# Patient Record
Sex: MALE | Race: WHITE | NOT HISPANIC OR LATINO | Employment: OTHER | URBAN - METROPOLITAN AREA
[De-identification: names, ages, dates, MRNs, and addresses within clinical notes are randomized per-mention and may not be internally consistent; named-entity substitution may affect disease eponyms.]

---

## 2023-10-15 ENCOUNTER — APPOINTMENT (OUTPATIENT)
Dept: RADIOLOGY | Facility: HOSPITAL | Age: 88
DRG: 177 | End: 2023-10-15
Payer: COMMERCIAL

## 2023-10-15 ENCOUNTER — HOSPITAL ENCOUNTER (INPATIENT)
Facility: HOSPITAL | Age: 88
LOS: 3 days | Discharge: HOME | DRG: 177 | End: 2023-10-18
Attending: EMERGENCY MEDICINE
Payer: COMMERCIAL

## 2023-10-15 DIAGNOSIS — U07.1 COVID: Primary | ICD-10-CM

## 2023-10-15 DIAGNOSIS — R04.2 HEMOPTYSIS: ICD-10-CM

## 2023-10-15 DIAGNOSIS — J18.9 PNEUMONIA OF RIGHT UPPER LOBE DUE TO INFECTIOUS ORGANISM: ICD-10-CM

## 2023-10-15 LAB
ALBUMIN SERPL BCP-MCNC: 4 G/DL (ref 3.4–5)
ALP SERPL-CCNC: 85 U/L (ref 33–136)
ALT SERPL W P-5'-P-CCNC: 23 U/L (ref 10–52)
ANION GAP SERPL CALC-SCNC: 15 MMOL/L (ref 10–20)
AST SERPL W P-5'-P-CCNC: 45 U/L (ref 9–39)
BASOPHILS # BLD AUTO: 0.01 X10*3/UL (ref 0–0.1)
BASOPHILS NFR BLD AUTO: 0.2 %
BILIRUB SERPL-MCNC: 1.2 MG/DL (ref 0–1.2)
BUN SERPL-MCNC: 25 MG/DL (ref 6–23)
CALCIUM SERPL-MCNC: 8.6 MG/DL (ref 8.6–10.3)
CARDIAC TROPONIN I PNL SERPL HS: 32 NG/L (ref 0–20)
CARDIAC TROPONIN I PNL SERPL HS: 35 NG/L (ref 0–20)
CHLORIDE SERPL-SCNC: 101 MMOL/L (ref 98–107)
CO2 SERPL-SCNC: 22 MMOL/L (ref 21–32)
CREAT SERPL-MCNC: 1.27 MG/DL (ref 0.5–1.3)
EOSINOPHIL # BLD AUTO: 0 X10*3/UL (ref 0–0.4)
EOSINOPHIL NFR BLD AUTO: 0 %
ERYTHROCYTE [DISTWIDTH] IN BLOOD BY AUTOMATED COUNT: 14.5 % (ref 11.5–14.5)
FLUAV RNA RESP QL NAA+PROBE: NOT DETECTED
FLUBV RNA RESP QL NAA+PROBE: NOT DETECTED
GFR SERPL CREATININE-BSD FRML MDRD: 54 ML/MIN/1.73M*2
GLUCOSE SERPL-MCNC: 89 MG/DL (ref 74–99)
HCT VFR BLD AUTO: 42.5 % (ref 41–52)
HGB BLD-MCNC: 14.1 G/DL (ref 13.5–17.5)
IMM GRANULOCYTES # BLD AUTO: 0.01 X10*3/UL (ref 0–0.5)
IMM GRANULOCYTES NFR BLD AUTO: 0.2 % (ref 0–0.9)
INR PPP: 2.2 (ref 0.9–1.1)
LACTATE SERPL-SCNC: 1.6 MMOL/L (ref 0.4–2)
LYMPHOCYTES # BLD AUTO: 1.33 X10*3/UL (ref 0.8–3)
LYMPHOCYTES NFR BLD AUTO: 25.7 %
MCH RBC QN AUTO: 32.2 PG (ref 26–34)
MCHC RBC AUTO-ENTMCNC: 33.2 G/DL (ref 32–36)
MCV RBC AUTO: 97 FL (ref 80–100)
MONOCYTES # BLD AUTO: 0.94 X10*3/UL (ref 0.05–0.8)
MONOCYTES NFR BLD AUTO: 18.1 %
NEUTROPHILS # BLD AUTO: 2.89 X10*3/UL (ref 1.6–5.5)
NEUTROPHILS NFR BLD AUTO: 55.8 %
NRBC BLD-RTO: 0 /100 WBCS (ref 0–0)
PLATELET # BLD AUTO: 145 X10*3/UL (ref 150–450)
PMV BLD AUTO: 9.5 FL (ref 7.5–11.5)
POTASSIUM SERPL-SCNC: 4.1 MMOL/L (ref 3.5–5.3)
PROT SERPL-MCNC: 7.2 G/DL (ref 6.4–8.2)
PROTHROMBIN TIME: 25.2 SECONDS (ref 9.8–12.8)
RBC # BLD AUTO: 4.38 X10*6/UL (ref 4.5–5.9)
SARS-COV-2 RNA RESP QL NAA+PROBE: DETECTED
SODIUM SERPL-SCNC: 134 MMOL/L (ref 136–145)
WBC # BLD AUTO: 5.2 X10*3/UL (ref 4.4–11.3)

## 2023-10-15 PROCEDURE — 84484 ASSAY OF TROPONIN QUANT: CPT | Performed by: EMERGENCY MEDICINE

## 2023-10-15 PROCEDURE — 87636 SARSCOV2 & INF A&B AMP PRB: CPT | Performed by: EMERGENCY MEDICINE

## 2023-10-15 PROCEDURE — 99223 1ST HOSP IP/OBS HIGH 75: CPT

## 2023-10-15 PROCEDURE — 96375 TX/PRO/DX INJ NEW DRUG ADDON: CPT

## 2023-10-15 PROCEDURE — 71045 X-RAY EXAM CHEST 1 VIEW: CPT

## 2023-10-15 PROCEDURE — 99285 EMERGENCY DEPT VISIT HI MDM: CPT | Performed by: EMERGENCY MEDICINE

## 2023-10-15 PROCEDURE — 85025 COMPLETE CBC W/AUTO DIFF WBC: CPT | Performed by: EMERGENCY MEDICINE

## 2023-10-15 PROCEDURE — 36415 COLL VENOUS BLD VENIPUNCTURE: CPT | Performed by: EMERGENCY MEDICINE

## 2023-10-15 PROCEDURE — 87186 SC STD MICRODIL/AGAR DIL: CPT | Mod: CMCLAB,PARLAB

## 2023-10-15 PROCEDURE — 2500000004 HC RX 250 GENERAL PHARMACY W/ HCPCS (ALT 636 FOR OP/ED)

## 2023-10-15 PROCEDURE — 87205 SMEAR GRAM STAIN: CPT | Mod: CMCLAB,PARLAB

## 2023-10-15 PROCEDURE — 71045 X-RAY EXAM CHEST 1 VIEW: CPT | Performed by: RADIOLOGY

## 2023-10-15 PROCEDURE — 96365 THER/PROPH/DIAG IV INF INIT: CPT

## 2023-10-15 PROCEDURE — 3E0DX3Z INTRODUCTION OF ANTI-INFLAMMATORY INTO MOUTH AND PHARYNX, EXTERNAL APPROACH: ICD-10-PCS | Performed by: INTERNAL MEDICINE

## 2023-10-15 PROCEDURE — 3E0333Z INTRODUCTION OF ANTI-INFLAMMATORY INTO PERIPHERAL VEIN, PERCUTANEOUS APPROACH: ICD-10-PCS

## 2023-10-15 PROCEDURE — 83605 ASSAY OF LACTIC ACID: CPT | Performed by: EMERGENCY MEDICINE

## 2023-10-15 PROCEDURE — 2500000004 HC RX 250 GENERAL PHARMACY W/ HCPCS (ALT 636 FOR OP/ED): Performed by: EMERGENCY MEDICINE

## 2023-10-15 PROCEDURE — 1200000002 HC GENERAL ROOM WITH TELEMETRY DAILY

## 2023-10-15 PROCEDURE — 80053 COMPREHEN METABOLIC PANEL: CPT | Performed by: EMERGENCY MEDICINE

## 2023-10-15 PROCEDURE — 87075 CULTR BACTERIA EXCEPT BLOOD: CPT | Mod: CMCLAB,PARLAB | Performed by: EMERGENCY MEDICINE

## 2023-10-15 PROCEDURE — 85610 PROTHROMBIN TIME: CPT | Performed by: EMERGENCY MEDICINE

## 2023-10-15 PROCEDURE — 94761 N-INVAS EAR/PLS OXIMETRY MLT: CPT

## 2023-10-15 PROCEDURE — 84484 ASSAY OF TROPONIN QUANT: CPT

## 2023-10-15 RX ORDER — TAMSULOSIN HYDROCHLORIDE 0.4 MG/1
0.4 CAPSULE ORAL NIGHTLY
COMMUNITY

## 2023-10-15 RX ORDER — WARFARIN 3 MG/1
3 TABLET ORAL
COMMUNITY

## 2023-10-15 RX ORDER — GUAIFENESIN 600 MG/1
600 TABLET, EXTENDED RELEASE ORAL 2 TIMES DAILY PRN
Status: DISCONTINUED | OUTPATIENT
Start: 2023-10-15 | End: 2023-10-18 | Stop reason: HOSPADM

## 2023-10-15 RX ORDER — DEXAMETHASONE SODIUM PHOSPHATE 10 MG/ML
6 INJECTION INTRAMUSCULAR; INTRAVENOUS EVERY 24 HOURS
Status: DISCONTINUED | OUTPATIENT
Start: 2023-10-15 | End: 2023-10-18 | Stop reason: HOSPADM

## 2023-10-15 RX ORDER — CEFTRIAXONE 1 G/50ML
1 INJECTION, SOLUTION INTRAVENOUS ONCE
Status: COMPLETED | OUTPATIENT
Start: 2023-10-15 | End: 2023-10-15

## 2023-10-15 RX ORDER — TAMSULOSIN HYDROCHLORIDE 0.4 MG/1
0.4 CAPSULE ORAL NIGHTLY
Status: DISCONTINUED | OUTPATIENT
Start: 2023-10-15 | End: 2023-10-18 | Stop reason: HOSPADM

## 2023-10-15 RX ORDER — ALBUTEROL SULFATE 0.83 MG/ML
3 SOLUTION RESPIRATORY (INHALATION)
Status: DISCONTINUED | OUTPATIENT
Start: 2023-10-15 | End: 2023-10-16

## 2023-10-15 RX ORDER — IPRATROPIUM BROMIDE AND ALBUTEROL SULFATE 2.5; .5 MG/3ML; MG/3ML
3 SOLUTION RESPIRATORY (INHALATION)
Status: DISCONTINUED | OUTPATIENT
Start: 2023-10-15 | End: 2023-10-16

## 2023-10-15 RX ORDER — DEXAMETHASONE SODIUM PHOSPHATE 10 MG/ML
6 INJECTION INTRAMUSCULAR; INTRAVENOUS ONCE
Status: COMPLETED | OUTPATIENT
Start: 2023-10-15 | End: 2023-10-15

## 2023-10-15 RX ORDER — L. ACIDOPHILUS/L.BULGARICUS 1MM CELL
1 TABLET ORAL DAILY
Status: DISCONTINUED | OUTPATIENT
Start: 2023-10-16 | End: 2023-10-18 | Stop reason: HOSPADM

## 2023-10-15 RX ORDER — CEFTRIAXONE 2 G/50ML
2 INJECTION, SOLUTION INTRAVENOUS EVERY 24 HOURS
Status: DISCONTINUED | OUTPATIENT
Start: 2023-10-16 | End: 2023-10-18 | Stop reason: HOSPADM

## 2023-10-15 RX ADMIN — CEFTRIAXONE SODIUM 1 G: 1 INJECTION, SOLUTION INTRAVENOUS at 18:51

## 2023-10-15 RX ADMIN — Medication: at 19:02

## 2023-10-15 RX ADMIN — AZITHROMYCIN 500 MG: 500 INJECTION, POWDER, LYOPHILIZED, FOR SOLUTION INTRAVENOUS at 19:33

## 2023-10-15 RX ADMIN — TAMSULOSIN HYDROCHLORIDE 0.4 MG: 0.4 CAPSULE ORAL at 20:20

## 2023-10-15 RX ADMIN — DEXAMETHASONE SODIUM PHOSPHATE 6 MG: 10 INJECTION, SOLUTION INTRAMUSCULAR; INTRAVENOUS at 18:46

## 2023-10-15 SDOH — SOCIAL STABILITY: SOCIAL INSECURITY: WERE YOU ABLE TO COMPLETE ALL THE BEHAVIORAL HEALTH SCREENINGS?: YES

## 2023-10-15 SDOH — SOCIAL STABILITY: SOCIAL INSECURITY: DOES ANYONE TRY TO KEEP YOU FROM HAVING/CONTACTING OTHER FRIENDS OR DOING THINGS OUTSIDE YOUR HOME?: NO

## 2023-10-15 SDOH — SOCIAL STABILITY: SOCIAL INSECURITY: HAS ANYONE EVER THREATENED TO HURT YOUR FAMILY OR YOUR PETS?: NO

## 2023-10-15 SDOH — SOCIAL STABILITY: SOCIAL INSECURITY: HAVE YOU HAD THOUGHTS OF HARMING ANYONE ELSE?: NO

## 2023-10-15 SDOH — SOCIAL STABILITY: SOCIAL INSECURITY: DO YOU FEEL ANYONE HAS EXPLOITED OR TAKEN ADVANTAGE OF YOU FINANCIALLY OR OF YOUR PERSONAL PROPERTY?: NO

## 2023-10-15 SDOH — SOCIAL STABILITY: SOCIAL INSECURITY: ARE THERE ANY APPARENT SIGNS OF INJURIES/BEHAVIORS THAT COULD BE RELATED TO ABUSE/NEGLECT?: NO

## 2023-10-15 SDOH — SOCIAL STABILITY: SOCIAL INSECURITY: ABUSE: ADULT

## 2023-10-15 SDOH — SOCIAL STABILITY: SOCIAL INSECURITY: DO YOU FEEL UNSAFE GOING BACK TO THE PLACE WHERE YOU ARE LIVING?: NO

## 2023-10-15 SDOH — SOCIAL STABILITY: SOCIAL INSECURITY: ARE YOU OR HAVE YOU BEEN THREATENED OR ABUSED PHYSICALLY, EMOTIONALLY, OR SEXUALLY BY ANYONE?: NO

## 2023-10-15 ASSESSMENT — PAIN SCALES - GENERAL
PAINLEVEL_OUTOF10: 0 - NO PAIN
PAINLEVEL_OUTOF10: 0 - NO PAIN

## 2023-10-15 ASSESSMENT — LIFESTYLE VARIABLES
REASON UNABLE TO ASSESS: YES
HOW OFTEN DO YOU HAVE A DRINK CONTAINING ALCOHOL: NEVER
HOW OFTEN DO YOU HAVE 6 OR MORE DRINKS ON ONE OCCASION: NEVER
EVER FELT BAD OR GUILTY ABOUT YOUR DRINKING: NO
AUDIT-C TOTAL SCORE: 0
SKIP TO QUESTIONS 9-10: 1
HAVE PEOPLE ANNOYED YOU BY CRITICIZING YOUR DRINKING: NO
EVER HAD A DRINK FIRST THING IN THE MORNING TO STEADY YOUR NERVES TO GET RID OF A HANGOVER: NO
PRESCIPTION_ABUSE_PAST_12_MONTHS: NO
SUBSTANCE_ABUSE_PAST_12_MONTHS: NO
AUDIT-C TOTAL SCORE: 0
HOW MANY STANDARD DRINKS CONTAINING ALCOHOL DO YOU HAVE ON A TYPICAL DAY: PATIENT DOES NOT DRINK
HAVE YOU EVER FELT YOU SHOULD CUT DOWN ON YOUR DRINKING: NO

## 2023-10-15 ASSESSMENT — COGNITIVE AND FUNCTIONAL STATUS - GENERAL
STANDING UP FROM CHAIR USING ARMS: A LITTLE
MOVING TO AND FROM BED TO CHAIR: A LITTLE
EATING MEALS: A LITTLE
DRESSING REGULAR UPPER BODY CLOTHING: A LITTLE
DRESSING REGULAR UPPER BODY CLOTHING: A LITTLE
DRESSING REGULAR LOWER BODY CLOTHING: A LITTLE
PERSONAL GROOMING: A LITTLE
DRESSING REGULAR LOWER BODY CLOTHING: A LITTLE
MOBILITY SCORE: 18
TOILETING: A LITTLE
MOVING FROM LYING ON BACK TO SITTING ON SIDE OF FLAT BED WITH BEDRAILS: A LITTLE
WALKING IN HOSPITAL ROOM: A LITTLE
MOVING FROM LYING ON BACK TO SITTING ON SIDE OF FLAT BED WITH BEDRAILS: A LITTLE
MOBILITY SCORE: 18
STANDING UP FROM CHAIR USING ARMS: A LITTLE
HELP NEEDED FOR BATHING: A LITTLE
HELP NEEDED FOR BATHING: A LITTLE
WALKING IN HOSPITAL ROOM: A LITTLE
PATIENT BASELINE BEDBOUND: NO
EATING MEALS: A LITTLE
TURNING FROM BACK TO SIDE WHILE IN FLAT BAD: A LITTLE
TOILETING: A LITTLE
DAILY ACTIVITIY SCORE: 18
MOVING TO AND FROM BED TO CHAIR: A LITTLE
CLIMB 3 TO 5 STEPS WITH RAILING: A LITTLE
CLIMB 3 TO 5 STEPS WITH RAILING: A LITTLE
TURNING FROM BACK TO SIDE WHILE IN FLAT BAD: A LITTLE
DAILY ACTIVITIY SCORE: 18
PERSONAL GROOMING: A LITTLE

## 2023-10-15 ASSESSMENT — ACTIVITIES OF DAILY LIVING (ADL)
TOILETING: NEEDS ASSISTANCE
ADEQUATE_TO_COMPLETE_ADL: YES
JUDGMENT_ADEQUATE_SAFELY_COMPLETE_DAILY_ACTIVITIES: NO
BATHING: NEEDS ASSISTANCE
WALKS IN HOME: NEEDS ASSISTANCE
DRESSING YOURSELF: NEEDS ASSISTANCE
HEARING - LEFT EAR: DIFFICULTY WITH NOISE
GROOMING: NEEDS ASSISTANCE
PATIENT'S MEMORY ADEQUATE TO SAFELY COMPLETE DAILY ACTIVITIES?: NO
HEARING - RIGHT EAR: DIFFICULTY WITH NOISE
FEEDING YOURSELF: NEEDS ASSISTANCE
LACK_OF_TRANSPORTATION: NO

## 2023-10-15 ASSESSMENT — COLUMBIA-SUICIDE SEVERITY RATING SCALE - C-SSRS
2. HAVE YOU ACTUALLY HAD ANY THOUGHTS OF KILLING YOURSELF?: NO
1. IN THE PAST MONTH, HAVE YOU WISHED YOU WERE DEAD OR WISHED YOU COULD GO TO SLEEP AND NOT WAKE UP?: NO
6. HAVE YOU EVER DONE ANYTHING, STARTED TO DO ANYTHING, OR PREPARED TO DO ANYTHING TO END YOUR LIFE?: NO

## 2023-10-15 ASSESSMENT — PATIENT HEALTH QUESTIONNAIRE - PHQ9
SUM OF ALL RESPONSES TO PHQ9 QUESTIONS 1 & 2: 0
1. LITTLE INTEREST OR PLEASURE IN DOING THINGS: NOT AT ALL
2. FEELING DOWN, DEPRESSED OR HOPELESS: NOT AT ALL

## 2023-10-15 ASSESSMENT — PAIN - FUNCTIONAL ASSESSMENT: PAIN_FUNCTIONAL_ASSESSMENT: 0-10

## 2023-10-15 NOTE — ED PROVIDER NOTES
HPI   Chief Complaint   Patient presents with    Flu Symptoms       Patient is a 89-year-old male, medical history significant for atrial fibrillation, pacemaker placement, presents to the emergency department cough shortness of breath.  Patient states symptoms going on for the past 5 days.  States he had some scant hemoptysis.  He states he has had some generalized malaise and arthralgias.  Patient is on Coumadin.  He was recently diagnosed with pneumonia just over a month ago.  He states this feels similar.  His daughter was recently diagnosed with COVID so he did have exposure.                          No data recorded                Patient History   Past Medical History:   Diagnosis Date    Permanent atrial fibrillation (CMS/HCC) 03/01/2022    Permanent atrial fibrillation with RVR    Personal history of other diseases of the circulatory system     History of cardiac disorder     Past Surgical History:   Procedure Laterality Date    OTHER SURGICAL HISTORY  12/07/2022    Pacemaker insertion     No family history on file.  Social History     Tobacco Use    Smoking status: Not on file    Smokeless tobacco: Not on file   Substance Use Topics    Alcohol use: Not on file    Drug use: Not on file       Physical Exam   ED Triage Vitals   Temp Heart Rate Resp BP   10/15/23 1526 10/15/23 1529 10/15/23 1529 10/15/23 1526   37.1 °C (98.8 °F) 79 17 107/60      SpO2 Temp src Heart Rate Source Patient Position   10/15/23 1529 -- -- --   92 %         BP Location FiO2 (%)     -- --             Physical Exam  Vitals and nursing note reviewed.   Constitutional:       General: He is not in acute distress.     Appearance: He is well-developed.   HENT:      Head: Normocephalic and atraumatic.   Eyes:      Conjunctiva/sclera: Conjunctivae normal.   Cardiovascular:      Rate and Rhythm: Normal rate and regular rhythm.      Heart sounds: No murmur heard.  Pulmonary:      Effort: Pulmonary effort is normal. No respiratory distress.       Breath sounds: Wheezing present.   Abdominal:      Palpations: Abdomen is soft.      Tenderness: There is no abdominal tenderness.   Musculoskeletal:         General: No swelling.      Cervical back: Neck supple.   Skin:     General: Skin is warm and dry.      Capillary Refill: Capillary refill takes less than 2 seconds.   Neurological:      Mental Status: He is alert.   Psychiatric:         Mood and Affect: Mood normal.         ED Course & MDM   ED Course as of 10/15/23 1824   Sun Oct 15, 2023   1737 INR therapeutic at 2.2.  Chest x-ray reviewed.  Persistent right upper lobe infiltrate.  Unchanged from prior. [MK]   1752 Metabolic panel demonstrates creatinine of 1.27.  Normal lactic acid.  Mild elevation of the BUN. [MK]      ED Course User Index  [MK] Luis Daniel Andrade MD         Diagnoses as of 10/15/23 1824   COVID   Hemoptysis   Pneumonia of right upper lobe due to infectious organism       Medical Decision Making  Medical Decision Making: Patient presents with cough shortness of breath.  He is borderline hypoxic on arrival.  Sepsis order set was utilized.  Chest x-ray does demonstrate right upper lobe infiltrate.  In review from 6 weeks ago, this is very similar.  He had been treated and now has had persistent cough.  I am not sure if this represents fulminant failure of outpatient treatment or if he has underlying malignancy.  Patient is also found to be COVID-positive.  With ambulation, he does have hypoxia.  Patient is started on Decadron.  I did cover him with antibiotics.  He will be admitted.      Differential Diagnoses Considered: COVID, pneumonia, bronchitis, pulmonary embolus    Chronic Medical Conditions Significantly Affecting Care: History of A-fib on anticoagulation    External Records Reviewed: I reviewed recent and relevant outside records including: [Most recent emergency department visit    Independent Interpretation of Studies:  I independently interpreted: Chest x-ray obtained    Escalation  of Care:  Appropriate for hospitalization given borderline hypoxia with persistent pneumonia  Social Determinants of Health Significantly Affecting Care:  Does not follow-up with primary care    Prescription Drug Consideration: IV Decadron, IV antibiotics    Diagnostic testing considered: Noncontributory    Discussion of Management with Other Providers:   I discussed the patient/results with: Admitting provider agrees plan of care          Procedure  Procedures     Luis Daniel Andrade MD  10/15/23 1849

## 2023-10-15 NOTE — H&P
History Of Present Illness  Fredrick Hutton is a 89 y.o. male with a medical history of permanent atrial fibrillation (on Coumadin), sick sinus syndrome, pacemaker placement, arthritis, who presented to [facility] ED today from home with cough and shortness of breath.  Patient states symptoms have been going on for the past 2-3 weeks.  States he had some scant hemoptysis, (granddaughter states sputum looks very bloody).  He states he has had some generalized malaise and arthralgias that started about 5 days ago.  He was recently diagnosed with pneumonia just over a month ago. States his cough never really went away and does not feel much improvement from when he came here in September and diagnosed with pneumonia. His daughter was recently diagnosed with COVID last wednesday so he did have exposure. Denies known fever, chills, dizziness, chest pain, abdominal pain, urinary symptoms, diarrhea, or constipation. PCP: Dr. Sergo Theodore. Cardiologist: Dr. Núñez. Podiatrist Dr. Teofilo Solis  ER Course: VS on arrival: 37.1C, HR 79, RR 17, 107/60, 91% on RA. EKG unavailable for my review. Labs show RBC 4.38. Platelets 145. sodium 134. BUN 25, GFR 54. AST 54. Troponin 35. PT 25.2, INR 2.2. Covid-10+. Flu negative. Blood cultures collected. See imaging results below. Azithromycin, ceftriaxone, dexamethasone given in ED. Pt will be admitted under the care of Dr. Vaughn who will continue to follow. I was asked to H&P and place initial admission orders.     Past Medical History  As above      Surgical History  As above       Social History  Former smoker. Denies alcohol or drug use. Lives with daughter. Ambulates independently.    Family History  No family history on file.     Allergies  Patient has no known allergies.    Review of Systems   10 point review of systems negative except as noted above.    Physical Exam  Constitutional:       Appearance: Normal appearance.   HENT:      Head: Normocephalic.      Nose: Nose normal.  "     Mouth/Throat:      Mouth: Mucous membranes are moist.      Pharynx: Oropharynx is clear.   Eyes:      Extraocular Movements: Extraocular movements intact.      Conjunctiva/sclera: Conjunctivae normal.      Pupils: Pupils are equal, round, and reactive to light.   Cardiovascular:      Rate and Rhythm: Normal rate. Rhythm irregular.      Pulses: Normal pulses.      Heart sounds: Normal heart sounds.   Pulmonary:      Effort: Pulmonary effort is normal.      Breath sounds: Normal breath sounds.   Abdominal:      General: Abdomen is flat. Bowel sounds are normal.      Palpations: Abdomen is soft.   Musculoskeletal:         General: Normal range of motion.      Cervical back: Normal range of motion and neck supple.   Skin:     General: Skin is warm and dry.      Capillary Refill: Capillary refill takes less than 2 seconds.   Neurological:      General: No focal deficit present.      Mental Status: He is alert and oriented to person, place, and time.   Psychiatric:         Mood and Affect: Mood normal.         Behavior: Behavior normal.         Thought Content: Thought content normal.         Judgment: Judgment normal.          Last Recorded Vitals  Blood pressure 101/62, pulse 78, temperature 37.1 °C (98.8 °F), resp. rate 20, height 1.753 m (5' 9\"), weight 66.7 kg (147 lb), SpO2 95 %.    Relevant Results  Results for orders placed or performed during the hospital encounter of 10/15/23 (from the past 24 hour(s))   Comprehensive Metabolic Panel   Result Value Ref Range    Glucose 89 74 - 99 mg/dL    Sodium 134 (L) 136 - 145 mmol/L    Potassium 4.1 3.5 - 5.3 mmol/L    Chloride 101 98 - 107 mmol/L    Bicarbonate 22 21 - 32 mmol/L    Anion Gap 15 10 - 20 mmol/L    Urea Nitrogen 25 (H) 6 - 23 mg/dL    Creatinine 1.27 0.50 - 1.30 mg/dL    eGFR 54 (L) >60 mL/min/1.73m*2    Calcium 8.6 8.6 - 10.3 mg/dL    Albumin 4.0 3.4 - 5.0 g/dL    Alkaline Phosphatase 85 33 - 136 U/L    Total Protein 7.2 6.4 - 8.2 g/dL    AST 45 (H) 9 - " 39 U/L    Bilirubin, Total 1.2 0.0 - 1.2 mg/dL    ALT 23 10 - 52 U/L   Lactate   Result Value Ref Range    Lactate 1.6 0.4 - 2.0 mmol/L   Troponin I, High Sensitivity   Result Value Ref Range    Troponin I, High Sensitivity 35 (H) 0 - 20 ng/L   Protime-INR   Result Value Ref Range    Protime 25.2 (H) 9.8 - 12.8 seconds    INR 2.2 (H) 0.9 - 1.1   SARS-CoV-2 RT PCR   Result Value Ref Range    Coronavirus 2019, PCR Detected (A) Not Detected   Influenza A, and B PCR   Result Value Ref Range    Flu A Result Not Detected Not Detected    Flu B Result Not Detected Not Detected   CBC and Auto Differential   Result Value Ref Range    WBC 5.2 4.4 - 11.3 x10*3/uL    nRBC 0.0 0.0 - 0.0 /100 WBCs    RBC 4.38 (L) 4.50 - 5.90 x10*6/uL    Hemoglobin 14.1 13.5 - 17.5 g/dL    Hematocrit 42.5 41.0 - 52.0 %    MCV 97 80 - 100 fL    MCH 32.2 26.0 - 34.0 pg    MCHC 33.2 32.0 - 36.0 g/dL    RDW 14.5 11.5 - 14.5 %    Platelets 145 (L) 150 - 450 x10*3/uL    MPV 9.5 7.5 - 11.5 fL    Neutrophils % 55.8 40.0 - 80.0 %    Immature Granulocytes %, Automated 0.2 0.0 - 0.9 %    Lymphocytes % 25.7 13.0 - 44.0 %    Monocytes % 18.1 2.0 - 10.0 %    Eosinophils % 0.0 0.0 - 6.0 %    Basophils % 0.2 0.0 - 2.0 %    Neutrophils Absolute 2.89 1.60 - 5.50 x10*3/uL    Immature Granulocytes Absolute, Automated 0.01 0.00 - 0.50 x10*3/uL    Lymphocytes Absolute 1.33 0.80 - 3.00 x10*3/uL    Monocytes Absolute 0.94 (H) 0.05 - 0.80 x10*3/uL    Eosinophils Absolute 0.00 0.00 - 0.40 x10*3/uL    Basophils Absolute 0.01 0.00 - 0.10 x10*3/uL     XR chest 1 view    Result Date: 10/15/2023  Interpreted By:  Khanh Rodriguez, STUDY: XR CHEST 1 VIEW;  10/15/2023 4:46 pm   INDICATION: Signs/Symptoms:sob.   COMPARISON: Portable chest and CT chest with contrast both from 2 September 2023   ACCESSION NUMBER(S): [id number]   ORDERING CLINICIAN: SYED DAVIS   TECHNIQUE: Single frontal view of the chest; Portable technique   FINDINGS:   The cardiomediastinal silhouette is  unchanged   Background emphysema unchanged   Unchanged right upper lobe consolidation and/or scarring   No new acute process such as a new area of airspace disease, large pleural effusion or demonstrable pneumothorax       Abnormal but unchanged from 2 September 2023   MACRO: None   Signed by: Khanh Rodriguez 10/15/2023 4:58 PM Dictation workstation:   [id number]        Assessment/Plan   Principal Problem:    COVID    89 year old male with a medical history of permanent atrial fibrillation (on Coumadin), sick sinus syndrome, pacemaker placement, arthritis, [facility] with cough and shortness of breath.  Patient states symptoms have been going on for the past 5 days.  States he had some scant hemoptysis, (granddaughter states sputum looks very bloody).  He states he has had some generalized malaise and arthralgias.  He was recently diagnosed with pneumonia just over a month ago. Pulmonology consulted. Continue antibiotics, nebulizers, steroids. Patient will be hospitalized for further medical management.    #Covid 19+ (supplemental oxygen requirement d/t mild hypoxia)  #Suspect pneumonia (RUL), suspect gram negative organism (recently had pneumonia about 1 month ago)  #Hemoptysis  #Elevated troponin, likely 2/2 demand ischemia  #Mild thrombocytopenia  #Generalized weakness  Admit to inpatient/telemetry to Dr. Vaughn  Pulmonology consult and appreciate recs  See imaging results below  Isolation precautions  Continue azithromycin and ceftriaxone  Dexamethosone 6mg IVP daily  Nebulizers  Mucinex  Hold Coumadin due to hemoptysis  Titrate oxygen to maintain sats >92%  Incentive spirometry  Troponin 35. Will trend.  Bronchial hygiene  Sputum culture ordered  Strep pneumonia and legionella urine ordered  UA ordered  Repeat labs in AM    Chronic issues  #Atrial fibrillation  #Sick sinus syndrome  #Arthritis  Continue home meds as appropriate when nursing completes home med rec.  Regular  diet  Full code    #DVT prophylaxis  Hold Coumadin due to hemoptysis  SCD's      I spent 45 minutes in the professional and overall care of this patient.    FULLY EVALUATED AND PLAN   Beatriz Gay, APRN-CNP

## 2023-10-15 NOTE — ED TRIAGE NOTES
Pt in ER with c/o flu-like symptoms. Per pt granddaughter the pt has a productive cough with blood, generalized weakness and a fever. Per pt granddaughter the pt daughter tested positive for Covid last Wednesday.

## 2023-10-15 NOTE — PROGRESS NOTES
Pharmacy Medication History Review    Fredrick Hutton is a 89 y.o. male admitted for No Principal Problem: There is no principal problem currently on the Problem List. Please update the Problem List and refresh.. Pharmacy reviewed the patient's [id number] medications and allergies for accuracy.    The list below reflectives the updated PTA list. Please review each medication in order reconciliation for additional clarification and justification.  (Not in a hospital admission)       The list below reflectives the updated allergy list. Please review each documented allergy for additional clarification and justification.  Allergies  Reviewed by Isabella Griffin CPhT on 10/15/2023   No Known Allergies         Below are additional concerns with the patient's PTA list.      Isabella Griffin CPhT

## 2023-10-16 ENCOUNTER — APPOINTMENT (OUTPATIENT)
Dept: RADIOLOGY | Facility: HOSPITAL | Age: 88
DRG: 177 | End: 2023-10-16
Payer: COMMERCIAL

## 2023-10-16 LAB
ANION GAP SERPL CALC-SCNC: 10 MMOL/L (ref 10–20)
APPEARANCE UR: ABNORMAL
BILIRUB UR STRIP.AUTO-MCNC: NEGATIVE MG/DL
BUN SERPL-MCNC: 25 MG/DL (ref 6–23)
CALCIUM SERPL-MCNC: 8.2 MG/DL (ref 8.6–10.3)
CARDIAC TROPONIN I PNL SERPL HS: 25 NG/L (ref 0–20)
CHLORIDE SERPL-SCNC: 107 MMOL/L (ref 98–107)
CO2 SERPL-SCNC: 23 MMOL/L (ref 21–32)
COLOR UR: YELLOW
CREAT SERPL-MCNC: 1.07 MG/DL (ref 0.5–1.3)
ERYTHROCYTE [DISTWIDTH] IN BLOOD BY AUTOMATED COUNT: 14.5 % (ref 11.5–14.5)
GFR SERPL CREATININE-BSD FRML MDRD: 66 ML/MIN/1.73M*2
GLUCOSE SERPL-MCNC: 124 MG/DL (ref 74–99)
GLUCOSE UR STRIP.AUTO-MCNC: NEGATIVE MG/DL
HCT VFR BLD AUTO: 40.8 % (ref 41–52)
HGB BLD-MCNC: 13.9 G/DL (ref 13.5–17.5)
HYALINE CASTS #/AREA URNS AUTO: ABNORMAL /LPF
INR PPP: 2.2 (ref 0.9–1.1)
KETONES UR STRIP.AUTO-MCNC: NEGATIVE MG/DL
LEUKOCYTE ESTERASE UR QL STRIP.AUTO: NEGATIVE
MCH RBC QN AUTO: 32.5 PG (ref 26–34)
MCHC RBC AUTO-ENTMCNC: 34.1 G/DL (ref 32–36)
MCV RBC AUTO: 95 FL (ref 80–100)
MUCOUS THREADS #/AREA URNS AUTO: ABNORMAL /LPF
NITRITE UR QL STRIP.AUTO: NEGATIVE
NRBC BLD-RTO: 0 /100 WBCS (ref 0–0)
PH UR STRIP.AUTO: 5 [PH]
PLATELET # BLD AUTO: 143 X10*3/UL (ref 150–450)
PMV BLD AUTO: 9.4 FL (ref 7.5–11.5)
POTASSIUM SERPL-SCNC: 4.2 MMOL/L (ref 3.5–5.3)
PROT UR STRIP.AUTO-MCNC: NEGATIVE MG/DL
PROTHROMBIN TIME: 25.5 SECONDS (ref 9.8–12.8)
RBC # BLD AUTO: 4.28 X10*6/UL (ref 4.5–5.9)
RBC # UR STRIP.AUTO: ABNORMAL /UL
RBC #/AREA URNS AUTO: ABNORMAL /HPF
SODIUM SERPL-SCNC: 136 MMOL/L (ref 136–145)
SP GR UR STRIP.AUTO: 1.02
UROBILINOGEN UR STRIP.AUTO-MCNC: <2 MG/DL
WBC # BLD AUTO: 3.9 X10*3/UL (ref 4.4–11.3)
WBC #/AREA URNS AUTO: ABNORMAL /HPF

## 2023-10-16 PROCEDURE — 82374 ASSAY BLOOD CARBON DIOXIDE: CPT

## 2023-10-16 PROCEDURE — 85027 COMPLETE CBC AUTOMATED: CPT

## 2023-10-16 PROCEDURE — 94667 MNPJ CHEST WALL 1ST: CPT

## 2023-10-16 PROCEDURE — 84484 ASSAY OF TROPONIN QUANT: CPT | Performed by: NURSE PRACTITIONER

## 2023-10-16 PROCEDURE — 36415 COLL VENOUS BLD VENIPUNCTURE: CPT

## 2023-10-16 PROCEDURE — 71250 CT THORAX DX C-: CPT | Mod: MG

## 2023-10-16 PROCEDURE — 87899 AGENT NOS ASSAY W/OPTIC: CPT | Mod: CMCLAB,PARLAB

## 2023-10-16 PROCEDURE — 2500000002 HC RX 250 W HCPCS SELF ADMINISTERED DRUGS (ALT 637 FOR MEDICARE OP, ALT 636 FOR OP/ED): Performed by: INTERNAL MEDICINE

## 2023-10-16 PROCEDURE — 2500000004 HC RX 250 GENERAL PHARMACY W/ HCPCS (ALT 636 FOR OP/ED)

## 2023-10-16 PROCEDURE — 94640 AIRWAY INHALATION TREATMENT: CPT

## 2023-10-16 PROCEDURE — 97165 OT EVAL LOW COMPLEX 30 MIN: CPT | Mod: GO

## 2023-10-16 PROCEDURE — 85610 PROTHROMBIN TIME: CPT | Performed by: NURSE PRACTITIONER

## 2023-10-16 PROCEDURE — 36415 COLL VENOUS BLD VENIPUNCTURE: CPT | Performed by: NURSE PRACTITIONER

## 2023-10-16 PROCEDURE — 87449 NOS EACH ORGANISM AG IA: CPT | Mod: CMCLAB,PARLAB

## 2023-10-16 PROCEDURE — 97162 PT EVAL MOD COMPLEX 30 MIN: CPT | Mod: GP

## 2023-10-16 PROCEDURE — 2500000002 HC RX 250 W HCPCS SELF ADMINISTERED DRUGS (ALT 637 FOR MEDICARE OP, ALT 636 FOR OP/ED)

## 2023-10-16 PROCEDURE — 71250 CT THORAX DX C-: CPT | Performed by: STUDENT IN AN ORGANIZED HEALTH CARE EDUCATION/TRAINING PROGRAM

## 2023-10-16 PROCEDURE — 1200000002 HC GENERAL ROOM WITH TELEMETRY DAILY

## 2023-10-16 PROCEDURE — 2500000001 HC RX 250 WO HCPCS SELF ADMINISTERED DRUGS (ALT 637 FOR MEDICARE OP): Performed by: INTERNAL MEDICINE

## 2023-10-16 PROCEDURE — 81001 URINALYSIS AUTO W/SCOPE: CPT

## 2023-10-16 RX ORDER — DEXAMETHASONE 6 MG/1
6 TABLET ORAL DAILY
Qty: 10 TABLET | Refills: 0 | Status: SHIPPED | OUTPATIENT
Start: 2023-10-16 | End: 2023-10-26

## 2023-10-16 RX ORDER — ALBUTEROL SULFATE 0.83 MG/ML
3 SOLUTION RESPIRATORY (INHALATION) EVERY 4 HOURS PRN
Status: DISCONTINUED | OUTPATIENT
Start: 2023-10-16 | End: 2023-10-18 | Stop reason: HOSPADM

## 2023-10-16 RX ORDER — GUAIFENESIN 600 MG/1
600 TABLET, EXTENDED RELEASE ORAL 2 TIMES DAILY
Qty: 28 TABLET | Refills: 0 | Status: SHIPPED | OUTPATIENT
Start: 2023-10-16 | End: 2023-10-30

## 2023-10-16 RX ORDER — IPRATROPIUM BROMIDE AND ALBUTEROL SULFATE 2.5; .5 MG/3ML; MG/3ML
3 SOLUTION RESPIRATORY (INHALATION) 3 TIMES DAILY PRN
Status: DISCONTINUED | OUTPATIENT
Start: 2023-10-16 | End: 2023-10-18 | Stop reason: HOSPADM

## 2023-10-16 RX ORDER — DOXYCYCLINE 100 MG/1
100 CAPSULE ORAL 2 TIMES DAILY
Qty: 20 CAPSULE | Refills: 0 | Status: SHIPPED | OUTPATIENT
Start: 2023-10-16 | End: 2023-10-26

## 2023-10-16 RX ADMIN — Medication 1 TABLET: at 10:51

## 2023-10-16 RX ADMIN — DEXAMETHASONE SODIUM PHOSPHATE 6 MG: 10 INJECTION, SOLUTION INTRAMUSCULAR; INTRAVENOUS at 21:15

## 2023-10-16 RX ADMIN — GUAIFENESIN 600 MG: 600 TABLET, EXTENDED RELEASE ORAL at 10:52

## 2023-10-16 RX ADMIN — IPRATROPIUM BROMIDE AND ALBUTEROL SULFATE 3 ML: .5; 3 SOLUTION RESPIRATORY (INHALATION) at 06:52

## 2023-10-16 RX ADMIN — AZITHROMYCIN MONOHYDRATE 500 MG: 500 INJECTION, POWDER, LYOPHILIZED, FOR SOLUTION INTRAVENOUS at 20:45

## 2023-10-16 RX ADMIN — CEFTRIAXONE SODIUM 2 G: 2 INJECTION, SOLUTION INTRAVENOUS at 20:44

## 2023-10-16 RX ADMIN — IPRATROPIUM BROMIDE AND ALBUTEROL SULFATE 3 ML: .5; 3 SOLUTION RESPIRATORY (INHALATION) at 19:54

## 2023-10-16 RX ADMIN — ALBUTEROL SULFATE 3 ML: 2.5 SOLUTION RESPIRATORY (INHALATION) at 06:53

## 2023-10-16 RX ADMIN — TAMSULOSIN HYDROCHLORIDE 0.4 MG: 0.4 CAPSULE ORAL at 20:45

## 2023-10-16 ASSESSMENT — COGNITIVE AND FUNCTIONAL STATUS - GENERAL
TURNING FROM BACK TO SIDE WHILE IN FLAT BAD: A LITTLE
HELP NEEDED FOR BATHING: A LOT
MOVING TO AND FROM BED TO CHAIR: A LITTLE
TOILETING: A LITTLE
DRESSING REGULAR UPPER BODY CLOTHING: A LITTLE
WALKING IN HOSPITAL ROOM: A LITTLE
MOBILITY SCORE: 18
TOILETING: A LITTLE
WALKING IN HOSPITAL ROOM: A LITTLE
MOVING FROM LYING ON BACK TO SITTING ON SIDE OF FLAT BED WITH BEDRAILS: A LITTLE
MOBILITY SCORE: 18
CLIMB 3 TO 5 STEPS WITH RAILING: A LITTLE
MOVING TO AND FROM BED TO CHAIR: A LITTLE
TURNING FROM BACK TO SIDE WHILE IN FLAT BAD: A LITTLE
MOVING FROM LYING ON BACK TO SITTING ON SIDE OF FLAT BED WITH BEDRAILS: A LITTLE
STANDING UP FROM CHAIR USING ARMS: A LITTLE
DRESSING REGULAR LOWER BODY CLOTHING: A LOT
HELP NEEDED FOR BATHING: A LOT
CLIMB 3 TO 5 STEPS WITH RAILING: A LITTLE
DAILY ACTIVITIY SCORE: 18
DAILY ACTIVITIY SCORE: 18
DRESSING REGULAR UPPER BODY CLOTHING: A LITTLE
STANDING UP FROM CHAIR USING ARMS: A LITTLE
DRESSING REGULAR LOWER BODY CLOTHING: A LOT

## 2023-10-16 ASSESSMENT — PAIN SCALES - GENERAL: PAINLEVEL_OUTOF10: 0 - NO PAIN

## 2023-10-16 ASSESSMENT — PAIN - FUNCTIONAL ASSESSMENT: PAIN_FUNCTIONAL_ASSESSMENT: 0-10

## 2023-10-16 NOTE — CONSULTS
Consults    Reason For Consult     Cough and dyspnea.    History Of Present Illness  Fredrick Hutton is a 89 y.o. male presenting with complaints of cough and dyspnea duration 2 to 3 weeks.  Patient was diagnosed with right upper lobe pneumonia in September and treated with antibiotics.  Despite that she has experienced cough and dyspnea for the last few weeks.  Patient denies chest pain.  Upon arrival he tested positive for COVID-19.  Patient was admitted for further management.  Denies fever or chills.  No nausea vomiting or diarrhea.  He had some mild hemoptysis at home.  Patient uses Coumadin for anticoagulation.  Recently he was exposed to his daughter who was positive for COVID.     Past Medical History  Past Medical History:   Diagnosis Date    Permanent atrial fibrillation (CMS/[facility]) 03/01/2022    Permanent atrial fibrillation with RVR    Personal history of other diseases of the circulatory system     History of cardiac disorder       Surgical History  Past Surgical History:   Procedure Laterality Date    OTHER SURGICAL HISTORY  12/07/2022    Pacemaker insertion        Social History  Social History     Tobacco Use    Smoking status: Former     Types: Cigarettes    Smokeless tobacco: Never   Substance Use Topics    Alcohol use: Not Currently    Drug use: Never       Family History  No family history on file.            Allergies  Patient has no known allergies.    Review of Systems     10 system review of systems performed and negative for any complaints outside of the ones mentioned in history of present illness.    Physical Exam     Head and face no deformities.  Oropharynx normal mucosa.  Neck is supple no thyromegaly.  Chest is symmetric no crackles.  Heart is regular no murmurs  Abdomen is soft and nontender  Lower extremities no edema  Skin is intact  Neurologically no gross motor or sensory deficit.    Vital Signs  Blood pressure 100/62, pulse 79, temperature 35.8 °C (96.4 °F), temperature source  "Temporal, resp. rate 20, height 1.753 m (5' 9\"), weight 66.7 kg (147 lb), SpO2 90 %.  Oxygen Therapy  SpO2: 90 %  Oxygen Therapy: None (Room air)  O2 Delivery Method: Nasal cannula       Relevant Results  XR chest 1 view 10/15/2023    Narrative  Interpreted By:  Khanh Rodriguez,  STUDY:  XR CHEST 1 VIEW;  10/15/2023 4:46 pm    INDICATION:  Signs/Symptoms:sob.    COMPARISON:  Portable chest and CT chest with contrast both from 2 September 2023    ACCESSION NUMBER(S):  [id number]    ORDERING CLINICIAN:  SYED DAVIS    TECHNIQUE:  Single frontal view of the chest; Portable technique    FINDINGS:    The cardiomediastinal silhouette is unchanged    Background emphysema unchanged    Unchanged right upper lobe consolidation and/or scarring    No new acute process such as a new area of airspace disease, large  pleural effusion or demonstrable pneumothorax    Impression  Abnormal but unchanged from 2 September 2023          Assessment/Plan        COVID-19 viral illness with cough and dyspnea.  Right upper lobe infiltrate which is unchanged compared to 6 weeks ago.  Differential diagnosis considering slowly resolving pneumonia versus scar tissue, versus malignancy which would be less likely.  History of atrial fibrillation patient is on Coumadin for anticoagulation he reports mild hemoptysis which has improved.    Plan:  Repeat another CAT scan of the chest in 6 to 8 weeks.  If right upper lobe infiltrate persists will discussed with the patient need for CT-guided needle biopsy versus conservative follow-up with CAT scans.  DVT prophylaxis.  Bronchodilators.  Administer oxygen if needed to maintain saturation above 90%.           Klever Hernandez MD    "

## 2023-10-16 NOTE — CARE PLAN
Problem: Fall/Injury  Goal: Not fall by end of shift  Outcome: Progressing  Goal: Be free from injury by end of the shift  Outcome: Progressing     Problem: Respiratory  Goal: Clear secretions with interventions this shift  Outcome: Progressing  Flowsheets (Taken 10/15/2023 2253)  Clear secretions with interventions this shift:   Encourage/provide pulmonary hygiene/secretion clearance   Med administration/monitoring of effect  Goal: Verbalize decreased shortness of breath this shift  Outcome: Progressing  Flowsheets (Taken 10/15/2023 2253)  Verbalize decreased shortness of breath this shift: Encourage/provide pulmonary hygiene/secretion clearance     Problem: Psychosocial Needs  Goal: Demonstrates ability to cope with hospitalization/illness  Outcome: Progressing  Flowsheets (Taken 10/15/2023 2253)  Demonstrates ability to cope with hospitalization/illness:   Encourage verbalization of feelings/concerns/expectations   Provide low-stimulation environment as needed   The patient's goals for the shift include remain free from falls    The clinical goals for the shift include improve resp function

## 2023-10-16 NOTE — DISCHARGE SUMMARY
Discharge Diagnosis  COVID    Issues Requiring Follow-Up  Patient fully evaluated today and stable.  No oxygen requirements.    Discharge Meds     Your medication list        START taking these medications        Instructions Last Dose Given Next Dose Due   dexAMETHasone 6 mg tablet  Commonly known as: Decadron      Take 1 tablet (6 mg) by mouth once daily for 10 days.       doxycycline 100 mg capsule  Commonly known as: Vibramycin      Take 1 capsule (100 mg) by mouth 2 times a day for 10 days. Take with at least 8 ounces (large glass) of water, do not lie down for 30 minutes after       guaiFENesin 600 mg 12 hr tablet  Commonly known as: Mucinex      Take 1 tablet (600 mg) by mouth 2 times a day for 14 days. Do not crush, chew, or split.              CONTINUE taking these medications        Instructions Last Dose Given Next Dose Due   tamsulosin 0.4 mg 24 hr capsule  Commonly known as: Flomax           warfarin 3 mg tablet  Commonly known as: Coumadin                     Where to Get Your Medications        These medications were sent to [id number] - [address] AT [facility] OF [facility]/[address], [facility] [address]      Phone: [phone]   dexAMETHasone 6 mg tablet  doxycycline 100 mg capsule  guaiFENesin 600 mg 12 hr tablet         Test Results Pending At Discharge  Pending Labs       Order Current Status    Respiratory Culture/Smear In process    Blood Culture Preliminary result    Blood Culture Preliminary result            Hospital Course      Tex August MD  Physician  Internal Medicine     H&P      Addendum     Date of Service: 10/15/2023  7:50 PM     Addendum       Expand All Collapse All    History Of Present Illness  Fredrick Hutton is a 89 y.o. male with a medical history of permanent atrial fibrillation (on Coumadin), sick sinus syndrome, pacemaker placement, arthritis, who presented to [facility] ED today from home with cough and shortness of  breath.  Patient states symptoms have been going on for the past 2-3 weeks.  States he had some scant hemoptysis, (granddaughter states sputum looks very bloody).  He states he has had some generalized malaise and arthralgias that started about 5 days ago.  He was recently diagnosed with pneumonia just over a month ago. States his cough never really went away and does not feel much improvement from when he came here in September and diagnosed with pneumonia. His daughter was recently diagnosed with COVID last wednesday so he did have exposure. Denies known fever, chills, dizziness, chest pain, abdominal pain, urinary symptoms, diarrhea, or constipation. PCP: Dr. Sergo Theodore. Cardiologist: Dr. Núñez. Podiatrist Dr. Teofilo Solis  ER Course: VS on arrival: 37.1C, HR 79, RR 17, 107/60, 91% on RA. EKG unavailable for my review. Labs show RBC 4.38. Platelets 145. sodium 134. BUN 25, GFR 54. AST 54. Troponin 35. PT 25.2, INR 2.2. Covid-10+. Flu negative. Blood cultures collected. See imaging results below. Azithromycin, ceftriaxone, dexamethasone given in ED. Pt will be admitted under the care of Dr. Vaughn who will continue to follow. I was asked to H&P and place initial admission orders.     Past Medical History  As above        Surgical History  As above        Social History  Former smoker. Denies alcohol or drug use. Lives with daughter. Ambulates independently.     Family History  Family History   No family history on file.        Allergies  Patient has no known allergies.     Review of Systems   10 point review of systems negative except as noted above.     Physical Exam  Constitutional:       Appearance: Normal appearance.   HENT:      Head: Normocephalic.      Nose: Nose normal.      Mouth/Throat:      Mouth: Mucous membranes are moist.      Pharynx: Oropharynx is clear.   Eyes:      Extraocular Movements: Extraocular movements intact.      Conjunctiva/sclera: Conjunctivae normal.      Pupils: Pupils are  "equal, round, and reactive to light.   Cardiovascular:      Rate and Rhythm: Normal rate. Rhythm irregular.      Pulses: Normal pulses.      Heart sounds: Normal heart sounds.   Pulmonary:      Effort: Pulmonary effort is normal.      Breath sounds: Normal breath sounds.   Abdominal:      General: Abdomen is flat. Bowel sounds are normal.      Palpations: Abdomen is soft.   Musculoskeletal:         General: Normal range of motion.      Cervical back: Normal range of motion and neck supple.   Skin:     General: Skin is warm and dry.      Capillary Refill: Capillary refill takes less than 2 seconds.   Neurological:      General: No focal deficit present.      Mental Status: He is alert and oriented to person, place, and time.   Psychiatric:         Mood and Affect: Mood normal.         Behavior: Behavior normal.         Thought Content: Thought content normal.         Judgment: Judgment normal.            Last Recorded Vitals  Blood pressure 101/62, pulse 78, temperature 37.1 °C (98.8 °F), resp. rate 20, height 1.753 m (5' 9\"), weight 66.7 kg (147 lb), SpO2 95 %.     Relevant Results        Results for orders placed or performed during the hospital encounter of 10/15/23 (from the past 24 hour(s))   Comprehensive Metabolic Panel   Result Value Ref Range     Glucose 89 74 - 99 mg/dL     Sodium 134 (L) 136 - 145 mmol/L     Potassium 4.1 3.5 - 5.3 mmol/L     Chloride 101 98 - 107 mmol/L     Bicarbonate 22 21 - 32 mmol/L     Anion Gap 15 10 - 20 mmol/L     Urea Nitrogen 25 (H) 6 - 23 mg/dL     Creatinine 1.27 0.50 - 1.30 mg/dL     eGFR 54 (L) >60 mL/min/1.73m*2     Calcium 8.6 8.6 - 10.3 mg/dL     Albumin 4.0 3.4 - 5.0 g/dL     Alkaline Phosphatase 85 33 - 136 U/L     Total Protein 7.2 6.4 - 8.2 g/dL     AST 45 (H) 9 - 39 U/L     Bilirubin, Total 1.2 0.0 - 1.2 mg/dL     ALT 23 10 - 52 U/L   Lactate   Result Value Ref Range     Lactate 1.6 0.4 - 2.0 mmol/L   Troponin I, High Sensitivity   Result Value Ref Range     Troponin " I, High Sensitivity 35 (H) 0 - 20 ng/L   Protime-INR   Result Value Ref Range     Protime 25.2 (H) 9.8 - 12.8 seconds     INR 2.2 (H) 0.9 - 1.1   SARS-CoV-2 RT PCR   Result Value Ref Range     Coronavirus 2019, PCR Detected (A) Not Detected   Influenza A, and B PCR   Result Value Ref Range     Flu A Result Not Detected Not Detected     Flu B Result Not Detected Not Detected   CBC and Auto Differential   Result Value Ref Range     WBC 5.2 4.4 - 11.3 x10*3/uL     nRBC 0.0 0.0 - 0.0 /100 WBCs     RBC 4.38 (L) 4.50 - 5.90 x10*6/uL     Hemoglobin 14.1 13.5 - 17.5 g/dL     Hematocrit 42.5 41.0 - 52.0 %     MCV 97 80 - 100 fL     MCH 32.2 26.0 - 34.0 pg     MCHC 33.2 32.0 - 36.0 g/dL     RDW 14.5 11.5 - 14.5 %     Platelets 145 (L) 150 - 450 x10*3/uL     MPV 9.5 7.5 - 11.5 fL     Neutrophils % 55.8 40.0 - 80.0 %     Immature Granulocytes %, Automated 0.2 0.0 - 0.9 %     Lymphocytes % 25.7 13.0 - 44.0 %     Monocytes % 18.1 2.0 - 10.0 %     Eosinophils % 0.0 0.0 - 6.0 %     Basophils % 0.2 0.0 - 2.0 %     Neutrophils Absolute 2.89 1.60 - 5.50 x10*3/uL     Immature Granulocytes Absolute, Automated 0.01 0.00 - 0.50 x10*3/uL     Lymphocytes Absolute 1.33 0.80 - 3.00 x10*3/uL     Monocytes Absolute 0.94 (H) 0.05 - 0.80 x10*3/uL     Eosinophils Absolute 0.00 0.00 - 0.40 x10*3/uL     Basophils Absolute 0.01 0.00 - 0.10 x10*3/uL      XR chest 1 view     Result Date: 10/15/2023  Interpreted By:  Khanh Rodriguez, STUDY: XR CHEST 1 VIEW;  10/15/2023 4:46 pm   INDICATION: Signs/Symptoms:sob.   COMPARISON: Portable chest and CT chest with contrast both from 2 September 2023   ACCESSION NUMBER(S): [id number]   ORDERING CLINICIAN: SYED DAVIS   TECHNIQUE: Single frontal view of the chest; Portable technique   FINDINGS:   The cardiomediastinal silhouette is unchanged   Background emphysema unchanged   Unchanged right upper lobe consolidation and/or scarring   No new acute process such as a new area of airspace disease, large pleural  effusion or demonstrable pneumothorax        Abnormal but unchanged from 2 September 2023   MACRO: None   Signed by: Khanh Rodriguez 10/15/2023 4:58 PM Dictation workstation:   [id number]            Assessment/Plan   Principal Problem:    COVID     89 year old male with a medical history of permanent atrial fibrillation (on Coumadin), sick sinus syndrome, pacemaker placement, arthritis, who presented to [facility] ED today from home with cough and shortness of breath.  Patient states symptoms have been going on for the past 5 days.  States he had some scant hemoptysis, (granddaughter states sputum looks very bloody).  He states he has had some generalized malaise and arthralgias.  He was recently diagnosed with pneumonia just over a month ago. Pulmonology consulted. Continue antibiotics, nebulizers, steroids. Patient will be hospitalized for further medical management.     #Covid 19+ (supplemental oxygen requirement d/t mild hypoxia)  #Suspect pneumonia (RUL), suspect gram negative organism (recently had pneumonia about 1 month ago)  #Hemoptysis  #Elevated troponin, likely 2/2 demand ischemia  #Mild thrombocytopenia  #Generalized weakness  Admit to inpatient/telemetry to Dr. Vaughn  Pulmonology consult and appreciate recs  See imaging results below  Isolation precautions  Continue azithromycin and ceftriaxone  Dexamethosone 6mg IVP daily  Nebulizers  Mucinex  Hold Coumadin due to hemoptysis  Titrate oxygen to maintain sats >92%  Incentive spirometry  Troponin 35. Will trend.  Bronchial hygiene  Sputum culture ordered  Strep pneumonia and legionella urine ordered  UA ordered  Repeat labs in AM     Chronic issues  #Atrial fibrillation  #Sick sinus syndrome  #Arthritis  Continue home meds as appropriate when nursing completes home med rec.  Regular diet  Full code     #DVT prophylaxis  Hold Coumadin due to hemoptysis  SCD's        I spent 45 minutes in the professional and overall care of this patient.     FULLY EVALUATED  AND SHRUTHI Jones-EBEN                       Revision History    Patient fully evaluated on October 16 and cleared for discharge.  Heart rate under good control and respiratory status is stable.  Discharged on oral steroids and antibiotics.     Pertinent Physical Exam At Time of Discharge  Physical Exam    Outpatient Follow-Up  Future Appointments   Date Time Provider Department Center   10/18/2023  1:00 PM [facility] [id number] [facility] [place]   10/24/2023  2:15 PM PAR [id number] [facility]ACIST [id number] [place]   11/14/2023  3:20 PM SHRUTHI Berrios-EBEN [id number] [place]         Tex August MD

## 2023-10-16 NOTE — PROGRESS NOTES
Occupational Therapy    Evaluation    Patient Name: Fredrick Hutton  MRN: [id number]  Today's Date: 10/16/2023  Time Calculation  Start Time: 1155  Stop Time: 1216  Time Calculation (min): 21 min        Assessment:  End of Session Communication: Bedside nurse  End of Session Patient Position:  (Supine in bed with all needs in reach and no complaints noted)     Plan:  OT Frequency: 3 times per week  OT Discharge Recommendations: Low intensity level of continued care       Subjective       General:  General  Reason for Referral: impaired adl  Referred By: Coco  Past Medical History Relevant to Rehab: pt. admitted with hemoptysis, cough/sob, dx:  pneumonia (+) covid, pmh:  sss, pacemaker, a fib, arthritis  Prior to Session Communication: Bedside nurse  General Comment: pt resting in bed, agreeable to therapy intervention  Precautions:  Precautions Comment: isolatin due to covid (+), telemetry, ext catheter  Vital Signs:     Pain:  Pain Assessment  Pain Assessment:  (no pain complaints)    Objective   Cognition:              Home Living:  Home Living Comments: pt. lives with his daughter, 1 floor, daughter works 2 days/wk, stall shower with gb, seat (doesnt use), gb by toilet, owns wh. walker  Prior Function:  Prior Function Comments: pt. independent with adl, does not drive, daughter does, daughter and patient share iadl tasks  IADL History:     ADL:  ADL Comments: pt. able to don/dof sock with mod assist, moderate exertion during task, would anticipate standing aspects of adl tasks to require cga  Activity Tolerance:  Endurance:  (deconditioned)  Bed Mobility/Transfers: Bed Mobility  Bed Mobility:  (sba supine <> sit)   and Transfers  Transfer:  (sit<> stand from eob required cga, mobility without device bed<> bathroom door completed with cga, pt. impulsive with cues for pacing)      Strength:  Strength Comments: bue strength/arom wfl    Outcome Measures:[facility] Daily Activity  Putting on and taking off regular lower  body clothing: A lot  Bathing (including washing, rinsing, drying): A lot  Putting on and taking off regular upper body clothing: A little  Toileting, which includes using toilet, bedpan or urinal: A little  Taking care of personal grooming such as brushing teeth: None  Eating Meals: None  Daily Activity - Total Score: 18        Education Documentation  Precautions, taught by Naty Zapata OT at 10/16/2023  1:59 PM.  Learner: Patient  Readiness: Acceptance  Method: Explanation  Response: Verbalizes Understanding, Needs Reinforcement    ADL Training, taught by Naty Zapata OT at 10/16/2023  1:59 PM.  Learner: Patient  Readiness: Acceptance  Method: Explanation  Response: Verbalizes Understanding, Needs Reinforcement    Precautions, taught by Naty Zapata OT at 10/16/2023  1:59 PM.  Learner: Patient  Readiness: Acceptance  Method: Explanation  Response: Verbalizes Understanding, Needs Reinforcement    Mobility Training, taught by Naty Zapata OT at 10/16/2023  1:59 PM.  Learner: Patient  Readiness: Acceptance  Method: Explanation  Response: Verbalizes Understanding, Needs Reinforcement    Education Comments  No comments found.        OP EDUCATION:       Goals:  Encounter Problems       Encounter Problems (Active)       OT Goals       increase bue ther ex/activity x 7-10 minutes and increase standing tolerance x 3-5 minutes to promote greater activity tolerance for assist with adl.   (Progressing)       Start:  10/16/23    Expected End:  10/23/23            Increase functional mobility and  functional transfers to supervision for bed/chair/toilet/shower with dme prn   (Progressing)       Start:  10/16/23    Expected End:  10/23/23            Increase lb dressing to supervision with dme prn  (Progressing)       Start:  10/16/23    Expected End:  10/23/23            Increase lb bathing to supervision with dme prn  (Progressing)       Start:  10/16/23    Expected End:  10/23/23            Increase toileting to  supervision with dme prn  (Progressing)       Start:  10/16/23    Expected End:  10/23/23

## 2023-10-16 NOTE — CARE PLAN
Problem: PT Problem  Goal: STG - Pt will transition supine <> sitting with SUP   Outcome: Progressing  Goal: STG - Pt will transfer STS with SUP   Outcome: Progressing  Goal: STG - Pt will amb 50' using no AD with SBA  Outcome: Progressing

## 2023-10-16 NOTE — PROGRESS NOTES
10/16/23 1426   Discharge Planning   Living Arrangements Children   Support Systems Children   Type of Residence Private residence   Number of Stairs to Enter Residence 3   Number of Stairs Within Residence 0   Do you have animals or pets at home? No   Who is requesting discharge planning? Provider   Patient expects to be discharged to: [facility]     Met with pt , pt would like to go  home at discharge, asking for therapy order, per pt he tells me that he is independent at home.   Pt asked me to call his daughter, as I did and per daughter she told me that he has a walker but will not use and she is not sure if home is best discharge plan at this time.  PCP is Dr Sergo Theodore.  Will follow.  Jazmine Dejesus RN [facility]  18,  spoke again with daughter, updated on pt's status and plan is for [facility] at discharge, this will be internal MD order, will alert team.  Jazmine Dejesus RN [facility]

## 2023-10-16 NOTE — CARE PLAN
Problem: OT Goals  Goal: increase bue ther ex/activity x 7-10 minutes and increase standing tolerance x 3-5 minutes to promote greater activity tolerance for assist with adl.    Outcome: Progressing  Goal: Increase functional mobility and  functional transfers to supervision for bed/chair/toilet/shower with dme prn    Outcome: Progressing  Goal: Increase lb dressing to supervision with dme prn   Outcome: Progressing  Goal: Increase lb bathing to supervision with dme prn   Outcome: Progressing  Goal: Increase toileting to supervision with dme prn   Outcome: Progressing

## 2023-10-16 NOTE — PROGRESS NOTES
Physical Therapy    Physical Therapy Evaluation    Patient Name: Fredrick Hutton  MRN: [id number]  Today's Date: 10/16/2023   Time Calculation  Start Time: 1154  Stop Time: 1216  Time Calculation (min): 22 min    Assessment/Plan   PT Assessment  PT Assessment Results: Decreased mobility  Rehab Prognosis: Good  End of Session Communication: Bedside nurse  End of Session Patient Position:  (Supine in bed with all needs in reach and no complaints noted)  IP OR SWING BED PT PLAN  Inpatient or Swing Bed: Inpatient  PT Plan  Treatment/Interventions: Bed mobility, Transfer training, Gait training  PT Plan: Skilled PT  PT Frequency: 3 times per week  PT Discharge Recommendations: Low intensity level of continued care    Subjective     Current Problem:  1. COVID        2. Hemoptysis        3. Pneumonia of right upper lobe due to infectious organism          Past Medical History:   Diagnosis Date    Permanent atrial fibrillation (CMS/HCC) 03/01/2022    Permanent atrial fibrillation with RVR    Personal history of other diseases of the circulatory system     History of cardiac disorder     Past Surgical History:   Procedure Laterality Date    OTHER SURGICAL HISTORY  12/07/2022    Pacemaker insertion       General Visit Information:  General  Reason for Referral: PT Eval and Treat  Referred By: Tex August  Prior to Session Communication: Bedside nurse  Patient Position Received:  (Supine in bed and agreeable to PT)    Home Living:  Home Living  Type of Home:  (Pt lives with his daughter in a 1 story house with 0STE. Pt has a stall shower with a seat and a standard toilet. Pt's daughter is working 2 days per week.)    Prior Level of Function:  Prior Function Per Pt/Caregiver Report  Level of [place]: Independent with ADLs and functional transfers (Pt amb without an AD however owns a RW, his daughter assists with IADLs and driving.)    Precautions:  Precautions  Precautions Comment: Covid+ precautions and fall  precautions    Objective     Pain:  Pain Assessment  Pain Assessment:  (0/10)    Cognition:  Cognition  Overall Cognitive Status: Within Functional Limits    General Assessments:  Sensation  Light Touch: No apparent deficits  Strength  Strength Comments: B LE ROM and strength WFL  Dynamic Standing Balance  Dynamic Standing-Comments: Fair standing balance without an AD    Functional Assessments:     Bed Mobility  Bed Mobility:  (supine < >sitting SBA)  Transfers  Transfer:  (STS from EOB: CGA)  Ambulation/Gait Training  Ambulation/Gait Training Performed:  (Pt was able to amb 20' x 2 without an AD with CGA demonstrating improved balance as he walked and with some cueing required for safety awareness.)    Outcome Measures:  [facility] Basic Mobility  Turning from your back to your side while in a flat bed without using bedrails: A little  Moving from lying on your back to sitting on the side of a flat bed without using bedrails: A little  Moving to and from bed to chair (including a wheelchair): A little  Standing up from a chair using your arms (e.g. wheelchair or bedside chair): A little  To walk in hospital room: A little  Climbing 3-5 steps with railing: A little  Basic Mobility - Total Score: 18     Goals:  Encounter Problems       Encounter Problems (Active)       PT Problem       STG - Pt will transition supine <> sitting with SUP  (Progressing)       Start:  10/16/23    Expected End:  10/30/23            STG - Pt will transfer STS with SUP  (Progressing)       Start:  10/16/23    Expected End:  10/30/23            STG - Pt will amb 50' using no AD with SBA (Progressing)       Start:  10/16/23    Expected End:  10/30/23                 Education Documentation  Precautions, taught by Freda Manzano PT at 10/16/2023  1:56 PM.  Learner: Patient  Readiness: Acceptance  Method: Explanation  Response: Verbalizes Understanding, Needs Reinforcement    Mobility Training, taught by Freda Manzano PT at 10/16/2023  1:56  PM.  Learner: Patient  Readiness: Acceptance  Method: Explanation  Response: Verbalizes Understanding, Needs Reinforcement    Education Comments  No comments found.

## 2023-10-17 ENCOUNTER — HOME HEALTH ADMISSION (OUTPATIENT)
Dept: HOME HEALTH SERVICES | Facility: HOME HEALTH | Age: 88
End: 2023-10-17
Payer: COMMERCIAL

## 2023-10-17 LAB
ALBUMIN SERPL BCP-MCNC: 3.7 G/DL (ref 3.4–5)
ALP SERPL-CCNC: 74 U/L (ref 33–136)
ALT SERPL W P-5'-P-CCNC: 32 U/L (ref 10–52)
ANION GAP SERPL CALC-SCNC: 13 MMOL/L (ref 10–20)
AST SERPL W P-5'-P-CCNC: 66 U/L (ref 9–39)
BILIRUB SERPL-MCNC: 0.7 MG/DL (ref 0–1.2)
BUN SERPL-MCNC: 33 MG/DL (ref 6–23)
CALCIUM SERPL-MCNC: 8.8 MG/DL (ref 8.6–10.3)
CHLORIDE SERPL-SCNC: 106 MMOL/L (ref 98–107)
CO2 SERPL-SCNC: 21 MMOL/L (ref 21–32)
CREAT SERPL-MCNC: 0.99 MG/DL (ref 0.5–1.3)
ERYTHROCYTE [DISTWIDTH] IN BLOOD BY AUTOMATED COUNT: 14.1 % (ref 11.5–14.5)
GFR SERPL CREATININE-BSD FRML MDRD: 73 ML/MIN/1.73M*2
GLUCOSE SERPL-MCNC: 132 MG/DL (ref 74–99)
HCT VFR BLD AUTO: 41 % (ref 41–52)
HGB BLD-MCNC: 14.1 G/DL (ref 13.5–17.5)
MCH RBC QN AUTO: 32 PG (ref 26–34)
MCHC RBC AUTO-ENTMCNC: 34.4 G/DL (ref 32–36)
MCV RBC AUTO: 93 FL (ref 80–100)
NRBC BLD-RTO: 0 /100 WBCS (ref 0–0)
PLATELET # BLD AUTO: 147 X10*3/UL (ref 150–450)
PMV BLD AUTO: 9.7 FL (ref 7.5–11.5)
POTASSIUM SERPL-SCNC: 4.3 MMOL/L (ref 3.5–5.3)
PROT SERPL-MCNC: 6.3 G/DL (ref 6.4–8.2)
RBC # BLD AUTO: 4.4 X10*6/UL (ref 4.5–5.9)
SODIUM SERPL-SCNC: 136 MMOL/L (ref 136–145)
WBC # BLD AUTO: 6.1 X10*3/UL (ref 4.4–11.3)

## 2023-10-17 PROCEDURE — 97110 THERAPEUTIC EXERCISES: CPT | Mod: GP

## 2023-10-17 PROCEDURE — 97530 THERAPEUTIC ACTIVITIES: CPT | Mod: GP

## 2023-10-17 PROCEDURE — 36415 COLL VENOUS BLD VENIPUNCTURE: CPT | Performed by: NURSE PRACTITIONER

## 2023-10-17 PROCEDURE — 2500000004 HC RX 250 GENERAL PHARMACY W/ HCPCS (ALT 636 FOR OP/ED): Performed by: INTERNAL MEDICINE

## 2023-10-17 PROCEDURE — 2500000005 HC RX 250 GENERAL PHARMACY W/O HCPCS: Performed by: INTERNAL MEDICINE

## 2023-10-17 PROCEDURE — 2500000002 HC RX 250 W HCPCS SELF ADMINISTERED DRUGS (ALT 637 FOR MEDICARE OP, ALT 636 FOR OP/ED): Performed by: INTERNAL MEDICINE

## 2023-10-17 PROCEDURE — 2500000004 HC RX 250 GENERAL PHARMACY W/ HCPCS (ALT 636 FOR OP/ED)

## 2023-10-17 PROCEDURE — 84075 ASSAY ALKALINE PHOSPHATASE: CPT | Performed by: NURSE PRACTITIONER

## 2023-10-17 PROCEDURE — 94640 AIRWAY INHALATION TREATMENT: CPT

## 2023-10-17 PROCEDURE — 1200000002 HC GENERAL ROOM WITH TELEMETRY DAILY

## 2023-10-17 PROCEDURE — 2500000001 HC RX 250 WO HCPCS SELF ADMINISTERED DRUGS (ALT 637 FOR MEDICARE OP): Performed by: INTERNAL MEDICINE

## 2023-10-17 PROCEDURE — XW033E5 INTRODUCTION OF REMDESIVIR ANTI-INFECTIVE INTO PERIPHERAL VEIN, PERCUTANEOUS APPROACH, NEW TECHNOLOGY GROUP 5: ICD-10-PCS | Performed by: INTERNAL MEDICINE

## 2023-10-17 PROCEDURE — 85027 COMPLETE CBC AUTOMATED: CPT | Performed by: NURSE PRACTITIONER

## 2023-10-17 RX ORDER — CHOLESTYRAMINE 4 G/4.8G
4 POWDER, FOR SUSPENSION ORAL 2 TIMES DAILY
Status: DISCONTINUED | OUTPATIENT
Start: 2023-10-17 | End: 2023-10-18 | Stop reason: HOSPADM

## 2023-10-17 RX ORDER — QUETIAPINE FUMARATE 25 MG/1
12.5 TABLET, FILM COATED ORAL 2 TIMES DAILY
Status: DISCONTINUED | OUTPATIENT
Start: 2023-10-17 | End: 2023-10-18 | Stop reason: HOSPADM

## 2023-10-17 RX ORDER — FUROSEMIDE 10 MG/ML
20 INJECTION INTRAMUSCULAR; INTRAVENOUS ONCE
Status: COMPLETED | OUTPATIENT
Start: 2023-10-17 | End: 2023-10-17

## 2023-10-17 RX ADMIN — AZITHROMYCIN MONOHYDRATE 500 MG: 500 INJECTION, POWDER, LYOPHILIZED, FOR SOLUTION INTRAVENOUS at 22:21

## 2023-10-17 RX ADMIN — QUETIAPINE FUMARATE 12.5 MG: 25 TABLET ORAL at 22:21

## 2023-10-17 RX ADMIN — QUETIAPINE FUMARATE 12.5 MG: 25 TABLET ORAL at 10:16

## 2023-10-17 RX ADMIN — DEXAMETHASONE SODIUM PHOSPHATE 6 MG: 10 INJECTION, SOLUTION INTRAMUSCULAR; INTRAVENOUS at 22:21

## 2023-10-17 RX ADMIN — FUROSEMIDE 20 MG: 10 INJECTION, SOLUTION INTRAMUSCULAR; INTRAVENOUS at 18:25

## 2023-10-17 RX ADMIN — TAMSULOSIN HYDROCHLORIDE 0.4 MG: 0.4 CAPSULE ORAL at 22:36

## 2023-10-17 RX ADMIN — IPRATROPIUM BROMIDE AND ALBUTEROL SULFATE 3 ML: .5; 3 SOLUTION RESPIRATORY (INHALATION) at 19:18

## 2023-10-17 RX ADMIN — REMDESIVIR 200 MG: 100 INJECTION, POWDER, LYOPHILIZED, FOR SOLUTION INTRAVENOUS at 16:30

## 2023-10-17 RX ADMIN — CHOLESTYRAMINE 4 G: 4 POWDER, FOR SUSPENSION ORAL at 22:21

## 2023-10-17 RX ADMIN — Medication 1 TABLET: at 09:17

## 2023-10-17 RX ADMIN — CEFTRIAXONE SODIUM 2 G: 2 INJECTION, SOLUTION INTRAVENOUS at 23:48

## 2023-10-17 RX ADMIN — IPRATROPIUM BROMIDE AND ALBUTEROL SULFATE 3 ML: .5; 3 SOLUTION RESPIRATORY (INHALATION) at 06:55

## 2023-10-17 ASSESSMENT — COGNITIVE AND FUNCTIONAL STATUS - GENERAL
CLIMB 3 TO 5 STEPS WITH RAILING: A LITTLE
MOBILITY SCORE: 23

## 2023-10-17 ASSESSMENT — PAIN SCALES - GENERAL
PAINLEVEL_OUTOF10: 0 - NO PAIN

## 2023-10-17 ASSESSMENT — PAIN - FUNCTIONAL ASSESSMENT
PAIN_FUNCTIONAL_ASSESSMENT: 0-10
PAIN_FUNCTIONAL_ASSESSMENT: 0-10

## 2023-10-17 NOTE — PROGRESS NOTES
Fredrick Hutton is a 89 y.o. male on day 2 of admission presenting with COVID.      Subjective   Patient having increased confusion       Objective     Last Recorded Vitals  /81 (BP Location: Right arm, Patient Position: Sitting)   Pulse 80   Temp 35.9 °C (96.6 °F) (Temporal)   Resp 17   Wt 66.7 kg (147 lb)   SpO2 94%   Intake/Output last 3 Shifts:    Intake/Output Summary (Last 24 hours) at 10/17/2023 1749  Last data filed at 10/17/2023 1630  Gross per 24 hour   Intake 550 ml   Output --   Net 550 ml       Admission Weight  Weight: 66.7 kg (147 lb) (10/15/23 1529)    Daily Weight  10/15/23 : 66.7 kg (147 lb)    Image Results  CT chest wo IV contrast  Narrative: Interpreted By:  Magalie Ramirez,   STUDY:  CT CHEST WO IV CONTRAST;  10/16/2023 10:20 am      INDICATION:  COVID PNA COUGHING UP BLOOD.      COMPARISON:  Chest 09/02/2023      ACCESSION NUMBER(S):  [id number]      ORDERING CLINICIAN:  MARY KAY CABAN      TECHNIQUE:  Helical data acquisition of the chest was obtained without IV  contrast material.  Images were reformatted in axial, coronal, and  sagittal planes.      FINDINGS:  LINE AND DEVICES:  Left cardiac pacer leads terminating in the right atrium and right  ventricle.      LUNGS AND AIRWAYS:  The trachea and central airways are patent without endobronchial  lesions.      Stable background moderate-to-severe centrilobular and paraseptal  emphysema. Similar-appearing severe right upper lobe subpleural  fibrotic changes with intervening airspace opacities. Ladora  subpleural bullae are present in the bilateral lower lobes and  lingula. Stable bilateral pleural effusions, small on right and trace  on the left. No new focal consolidation, pulmonary edema or  pneumothorax. There is no suspicious nodules.      MEDIASTINUM AND MAKENNA, LOWER NECK AND AXILLA:  The visualized thyroid gland is within normal limits.      No pathologically enlarged thoracic lymphadenopathy is identified.       Esophagus appears within normal limits as seen.      HEART AND VESSELS:  The thoracic aorta is of normal course and caliber with mild  atherosclerotic calcifications.      Main pulmonary artery is normal in caliber.      Minimal coronary artery calcifications in the LAD and circumflex  arteries. The study is not optimized for evaluation of coronary  arteries.      Mild left atrial enlargement.      No evidence of pericardial effusion.      UPPER ABDOMEN:  No acute findings.      CHEST WALL AND OSSEOUS STRUCTURES:  No acute osseous lesions. Severe left acromioclavicular  osteoarthritis.      Impression: Stable right upper interstitial fibrosis with superimposed airspace  opacities. Differential considerations may include persistent  pneumonia, chronic atelectasis, and neoplastic process. Consider  follow-up in 3-6 months to resolution.      Stable small right and trace left pleural effusions.      MACRO:  None      Signed by: Magalie Ramirez 10/16/2023 12:04 PM  Dictation workstation:   [id number]      Physical Exam    Relevant Results               Assessment/Plan   This patient currently has cardiac telemetry ordered; if you would like to modify or discontinue the telemetry order, click here to go to the orders activity to modify/discontinue the order.              Principal Problem:    PAXTON August MD  Physician  Internal Medicine     H&P      Addendum     Date of Service: 10/15/2023  7:50 PM     Addendum       Expand All Collapse All    History Of Present Illness  Fredrick Hutton is a 89 y.o. male with a medical history of permanent atrial fibrillation (on Coumadin), sick sinus syndrome, pacemaker placement, arthritis, who presented to [facility] ED today from home with cough and shortness of breath.  Patient states symptoms have been going on for the past 2-3 weeks.  States he had some scant hemoptysis, (granddaughter states sputum looks very bloody).  He states he has had some generalized  malaise and arthralgias that started about 5 days ago.  He was recently diagnosed with pneumonia just over a month ago. States his cough never really went away and does not feel much improvement from when he came here in September and diagnosed with pneumonia. His daughter was recently diagnosed with COVID last wednesday so he did have exposure. Denies known fever, chills, dizziness, chest pain, abdominal pain, urinary symptoms, diarrhea, or constipation. PCP: Dr. Sergo Theodore. Cardiologist: Dr. Núñez. Podiatrist Dr. Teofilo Solis  ER Course: VS on arrival: 37.1C, HR 79, RR 17, 107/60, 91% on RA. EKG unavailable for my review. Labs show RBC 4.38. Platelets 145. sodium 134. BUN 25, GFR 54. AST 54. Troponin 35. PT 25.2, INR 2.2. Covid-10+. Flu negative. Blood cultures collected. See imaging results below. Azithromycin, ceftriaxone, dexamethasone given in ED. Pt will be admitted under the care of Dr. Vaughn who will continue to follow. I was asked to H&P and place initial admission orders.     Past Medical History  As above        Surgical History  As above        Social History  Former smoker. Denies alcohol or drug use. Lives with daughter. Ambulates independently.     Family History  Family History   No family history on file.        Allergies  Patient has no known allergies.     Review of Systems   10 point review of systems negative except as noted above.     Physical Exam  Constitutional:       Appearance: Normal appearance.   HENT:      Head: Normocephalic.      Nose: Nose normal.      Mouth/Throat:      Mouth: Mucous membranes are moist.      Pharynx: Oropharynx is clear.   Eyes:      Extraocular Movements: Extraocular movements intact.      Conjunctiva/sclera: Conjunctivae normal.      Pupils: Pupils are equal, round, and reactive to light.   Cardiovascular:      Rate and Rhythm: Normal rate. Rhythm irregular.      Pulses: Normal pulses.      Heart sounds: Normal heart sounds.   Pulmonary:      Effort:  "Pulmonary effort is normal.      Breath sounds: Normal breath sounds.   Abdominal:      General: Abdomen is flat. Bowel sounds are normal.      Palpations: Abdomen is soft.   Musculoskeletal:         General: Normal range of motion.      Cervical back: Normal range of motion and neck supple.   Skin:     General: Skin is warm and dry.      Capillary Refill: Capillary refill takes less than 2 seconds.   Neurological:      General: No focal deficit present.      Mental Status: He is alert and oriented to person, place, and time.   Psychiatric:         Mood and Affect: Mood normal.         Behavior: Behavior normal.         Thought Content: Thought content normal.         Judgment: Judgment normal.            Last Recorded Vitals  Blood pressure 101/62, pulse 78, temperature 37.1 °C (98.8 °F), resp. rate 20, height 1.753 m (5' 9\"), weight 66.7 kg (147 lb), SpO2 95 %.     Relevant Results        Results for orders placed or performed during the hospital encounter of 10/15/23 (from the past 24 hour(s))   Comprehensive Metabolic Panel   Result Value Ref Range     Glucose 89 74 - 99 mg/dL     Sodium 134 (L) 136 - 145 mmol/L     Potassium 4.1 3.5 - 5.3 mmol/L     Chloride 101 98 - 107 mmol/L     Bicarbonate 22 21 - 32 mmol/L     Anion Gap 15 10 - 20 mmol/L     Urea Nitrogen 25 (H) 6 - 23 mg/dL     Creatinine 1.27 0.50 - 1.30 mg/dL     eGFR 54 (L) >60 mL/min/1.73m*2     Calcium 8.6 8.6 - 10.3 mg/dL     Albumin 4.0 3.4 - 5.0 g/dL     Alkaline Phosphatase 85 33 - 136 U/L     Total Protein 7.2 6.4 - 8.2 g/dL     AST 45 (H) 9 - 39 U/L     Bilirubin, Total 1.2 0.0 - 1.2 mg/dL     ALT 23 10 - 52 U/L   Lactate   Result Value Ref Range     Lactate 1.6 0.4 - 2.0 mmol/L   Troponin I, High Sensitivity   Result Value Ref Range     Troponin I, High Sensitivity 35 (H) 0 - 20 ng/L   Protime-INR   Result Value Ref Range     Protime 25.2 (H) 9.8 - 12.8 seconds     INR 2.2 (H) 0.9 - 1.1   SARS-CoV-2 RT PCR   Result Value Ref Range     " Coronavirus 2019, PCR Detected (A) Not Detected   Influenza A, and B PCR   Result Value Ref Range     Flu A Result Not Detected Not Detected     Flu B Result Not Detected Not Detected   CBC and Auto Differential   Result Value Ref Range     WBC 5.2 4.4 - 11.3 x10*3/uL     nRBC 0.0 0.0 - 0.0 /100 WBCs     RBC 4.38 (L) 4.50 - 5.90 x10*6/uL     Hemoglobin 14.1 13.5 - 17.5 g/dL     Hematocrit 42.5 41.0 - 52.0 %     MCV 97 80 - 100 fL     MCH 32.2 26.0 - 34.0 pg     MCHC 33.2 32.0 - 36.0 g/dL     RDW 14.5 11.5 - 14.5 %     Platelets 145 (L) 150 - 450 x10*3/uL     MPV 9.5 7.5 - 11.5 fL     Neutrophils % 55.8 40.0 - 80.0 %     Immature Granulocytes %, Automated 0.2 0.0 - 0.9 %     Lymphocytes % 25.7 13.0 - 44.0 %     Monocytes % 18.1 2.0 - 10.0 %     Eosinophils % 0.0 0.0 - 6.0 %     Basophils % 0.2 0.0 - 2.0 %     Neutrophils Absolute 2.89 1.60 - 5.50 x10*3/uL     Immature Granulocytes Absolute, Automated 0.01 0.00 - 0.50 x10*3/uL     Lymphocytes Absolute 1.33 0.80 - 3.00 x10*3/uL     Monocytes Absolute 0.94 (H) 0.05 - 0.80 x10*3/uL     Eosinophils Absolute 0.00 0.00 - 0.40 x10*3/uL     Basophils Absolute 0.01 0.00 - 0.10 x10*3/uL      XR chest 1 view     Result Date: 10/15/2023  Interpreted By:  Khanh Rodriguez, STUDY: XR CHEST 1 VIEW;  10/15/2023 4:46 pm   INDICATION: Signs/Symptoms:sob.   COMPARISON: Portable chest and CT chest with contrast both from 2 September 2023   ACCESSION NUMBER(S): [id number]   ORDERING CLINICIAN: SYED DAVIS   TECHNIQUE: Single frontal view of the chest; Portable technique   FINDINGS:   The cardiomediastinal silhouette is unchanged   Background emphysema unchanged   Unchanged right upper lobe consolidation and/or scarring   No new acute process such as a new area of airspace disease, large pleural effusion or demonstrable pneumothorax        Abnormal but unchanged from 2 September 2023   MACRO: None   Signed by: Khanh Rodriguez 10/15/2023 4:58 PM Dictation workstation:   [id number]             Assessment/Plan   Principal Problem:    COVID     89 year old male with a medical history of permanent atrial fibrillation (on Coumadin), sick sinus syndrome, pacemaker placement, arthritis, who presented to [facility] ED today from home with cough and shortness of breath.  Patient states symptoms have been going on for the past 5 days.  States he had some scant hemoptysis, (granddaughter states sputum looks very bloody).  He states he has had some generalized malaise and arthralgias.  He was recently diagnosed with pneumonia just over a month ago. Pulmonology consulted. Continue antibiotics, nebulizers, steroids. Patient will be hospitalized for further medical management.     #Covid 19+ (supplemental oxygen requirement d/t mild hypoxia)  #Suspect pneumonia (RUL), suspect gram negative organism (recently had pneumonia about 1 month ago)  #Hemoptysis  #Elevated troponin, likely 2/2 demand ischemia  #Mild thrombocytopenia  #Generalized weakness  Admit to inpatient/telemetry to Dr. Vaughn  Pulmonology consult and appreciate recs  See imaging results below  Isolation precautions  Continue azithromycin and ceftriaxone  Dexamethosone 6mg IVP daily  Nebulizers  Mucinex  Hold Coumadin due to hemoptysis  Titrate oxygen to maintain sats >92%  Incentive spirometry  Troponin 35. Will trend.  Bronchial hygiene  Sputum culture ordered  Strep pneumonia and legionella urine ordered  UA ordered  Repeat labs in AM     Chronic issues  #Atrial fibrillation  #Sick sinus syndrome  #Arthritis  Continue home meds as appropriate when nursing completes home med rec.  Regular diet  Full code     #DVT prophylaxis  Hold Coumadin due to hemoptysis  SCD's        I spent 45 minutes in the professional and overall care of this patient.     FULLY EVALUATED AND PLAN   Beatriz Gay, SHRUTHI-CNP                       Revision History               Patient fully evaluated on October 17.  Requiring oxygen at increased levels.  Remdesivir added to  present regimen.  Repeat chest x-ray ordered.  Seroquel for delirium.    Tex August MD

## 2023-10-17 NOTE — PROGRESS NOTES
Physical Therapy    Physical Therapy Treatment    Patient Name: Fredrick Hutton  MRN: [id number]  Today's Date: 10/17/2023  Time Calculation  Start Time: 1300  Stop Time: 1330  Time Calculation (min): 30 min     Assessment/Plan   PT Assessment  PT Assessment Results: Decreased mobility  Rehab Prognosis: Good  End of Session Communication: Bedside nurse  End of Session Patient Position:  (Sitting up in his room with all needs in reach and no complaints noted.)  PT Plan  Treatment/Interventions: Bed mobility, Transfer training, Gait training  PT Plan: Skilled PT  PT Frequency: 3 times per week  PT Discharge Recommendations: Low intensity level of continued care      General Visit Information:   PT  Visit  PT Received On: 10/17/23  General  Reason for Referral: PT Eval and Treat  Referred By: Tex August  Prior to Session Communication: Bedside nurse  Patient Position Received:  (Pt received sitting up in his room and agreeable to PT)    Subjective   Precautions:  Precautions  Precautions Comment: isolatin due to covid (+)    Objective   Pain:  Pain Assessment  Pain Assessment:  (0/10)  Cognition:  Cognition  Overall Cognitive Status: Within Functional Limits     Treatments:  Therapeutic Exercise  Therapeutic Exercise Performed:  (Pt instructed in seated B LE ther ex including LAQs, marches, hip add with pillow, and HR)    Bed Mobility  Bed Mobility:  (not assessed due to pt being out of bed)    Ambulation/Gait Training  Ambulation/Gait Training Performed:  (Pt observed amb around his room independently without an AD demonstraing improved balance and stability.)  Transfers  Transfer:  (STS: independent)    Outcome Measures:  [facility] Basic Mobility  Turning from your back to your side while in a flat bed without using bedrails: None  Moving from lying on your back to sitting on the side of a flat bed without using bedrails: None  Moving to and from bed to chair (including a wheelchair): None  Standing up from a chair  using your arms (e.g. wheelchair or bedside chair): None  To walk in hospital room: None  Climbing 3-5 steps with railing: A little  Basic Mobility - Total Score: 23    Education Documentation  Precautions, taught by Freda Manzano PT at 10/17/2023  2:12 PM.  Learner: Patient  Readiness: Acceptance  Method: Explanation  Response: Verbalizes Understanding    Mobility Training, taught by Freda Manzano PT at 10/17/2023  2:12 PM.  Learner: Patient  Readiness: Acceptance  Method: Explanation  Response: Verbalizes Understanding    Education Comments  No comments found.      OP EDUCATION:       Encounter Problems       Encounter Problems (Active)       PT Problem       STG - Pt will transition supine <> sitting with SUP  (Progressing)       Start:  10/16/23    Expected End:  10/30/23            STG - Pt will transfer STS with SUP  (Progressing)       Start:  10/16/23    Expected End:  10/30/23            STG - Pt will amb 50' using no AD with SBA (Progressing)       Start:  10/16/23    Expected End:  10/30/23

## 2023-10-17 NOTE — CARE PLAN
Problem: Fall/Injury  Goal: Not fall by end of shift  Outcome: Progressing  Goal: Be free from injury by end of the shift  Outcome: Progressing  Goal: Verbalize understanding of personal risk factors for fall in the hospital  Outcome: Progressing  Goal: Verbalize understanding of risk factor reduction measures to prevent injury from fall in the home  Outcome: Progressing  Goal: Use assistive devices by end of the shift  Outcome: Progressing  Goal: Pace activities to prevent fatigue by end of the shift  Outcome: Progressing     Problem: Respiratory  Goal: Clear secretions with interventions this shift  Outcome: Progressing  Goal: Minimize anxiety/maximize coping throughout shift  Outcome: Progressing  Goal: Minimal/no exertional discomfort or dyspnea this shift  Outcome: Progressing  Goal: No signs of respiratory distress (eg. Use of accessory muscles. Peds grunting)  Outcome: Progressing  Goal: Patent airway maintained this shift  Outcome: Progressing  Goal: Tolerate mechanical ventilation evidenced by VS/agitation level this shift  Outcome: Progressing  Goal: Tolerate pulmonary toileting this shift  Outcome: Progressing  Goal: Verbalize decreased shortness of breath this shift  Outcome: Progressing  Goal: Wean oxygen to maintain O2 saturation per order/standard this shift  Outcome: Progressing  Goal: Increase self care and/or family involvement in next 24 hours  Outcome: Progressing     Problem: Psychosocial Needs  Goal: Demonstrates ability to cope with hospitalization/illness  Outcome: Progressing  Goal: Collaborate with me, my family, and caregiver to identify my specific goals  Outcome: Progressing     Problem: Discharge Barriers  Goal: My discharge needs are met  Outcome: Progressing   The patient's goals for the shift include remain free from falls    The clinical goals for the shift include improve resp function

## 2023-10-18 ENCOUNTER — APPOINTMENT (OUTPATIENT)
Dept: RADIOLOGY | Facility: HOSPITAL | Age: 88
DRG: 177 | End: 2023-10-18
Payer: COMMERCIAL

## 2023-10-18 ENCOUNTER — TELEPHONE (OUTPATIENT)
Dept: INPATIENT UNIT | Facility: HOSPITAL | Age: 88
End: 2023-10-18

## 2023-10-18 VITALS
BODY MASS INDEX: 21.78 KG/M2 | OXYGEN SATURATION: 93 % | WEIGHT: 147.05 LBS | HEIGHT: 69 IN | RESPIRATION RATE: 16 BRPM | TEMPERATURE: 97.2 F | HEART RATE: 81 BPM | SYSTOLIC BLOOD PRESSURE: 132 MMHG | DIASTOLIC BLOOD PRESSURE: 72 MMHG

## 2023-10-18 LAB
ALBUMIN SERPL BCP-MCNC: 3.7 G/DL (ref 3.4–5)
ALP SERPL-CCNC: 74 U/L (ref 33–136)
ALT SERPL W P-5'-P-CCNC: 45 U/L (ref 10–52)
ANION GAP SERPL CALC-SCNC: 15 MMOL/L (ref 10–20)
AST SERPL W P-5'-P-CCNC: 117 U/L (ref 9–39)
BACTERIA SPEC RESP CULT: ABNORMAL
BILIRUB SERPL-MCNC: 0.8 MG/DL (ref 0–1.2)
BUN SERPL-MCNC: 40 MG/DL (ref 6–23)
CALCIUM SERPL-MCNC: 8.5 MG/DL (ref 8.6–10.3)
CHLORIDE SERPL-SCNC: 102 MMOL/L (ref 98–107)
CO2 SERPL-SCNC: 21 MMOL/L (ref 21–32)
CREAT SERPL-MCNC: 1.21 MG/DL (ref 0.5–1.3)
ERYTHROCYTE [DISTWIDTH] IN BLOOD BY AUTOMATED COUNT: 14.2 % (ref 11.5–14.5)
GFR SERPL CREATININE-BSD FRML MDRD: 57 ML/MIN/1.73M*2
GLUCOSE SERPL-MCNC: 119 MG/DL (ref 74–99)
GRAM STN SPEC: ABNORMAL
GRAM STN SPEC: ABNORMAL
HCT VFR BLD AUTO: 40.8 % (ref 41–52)
HGB BLD-MCNC: 13.9 G/DL (ref 13.5–17.5)
LEGIONELLA AG UR QL: NEGATIVE
MCH RBC QN AUTO: 32.1 PG (ref 26–34)
MCHC RBC AUTO-ENTMCNC: 34.1 G/DL (ref 32–36)
MCV RBC AUTO: 94 FL (ref 80–100)
NRBC BLD-RTO: 0 /100 WBCS (ref 0–0)
PLATELET # BLD AUTO: 165 X10*3/UL (ref 150–450)
PMV BLD AUTO: 9.8 FL (ref 7.5–11.5)
POTASSIUM SERPL-SCNC: 4.1 MMOL/L (ref 3.5–5.3)
PROT SERPL-MCNC: 6.4 G/DL (ref 6.4–8.2)
RBC # BLD AUTO: 4.33 X10*6/UL (ref 4.5–5.9)
S PNEUM AG UR QL: NEGATIVE
SODIUM SERPL-SCNC: 134 MMOL/L (ref 136–145)
WBC # BLD AUTO: 9.9 X10*3/UL (ref 4.4–11.3)

## 2023-10-18 PROCEDURE — 2500000004 HC RX 250 GENERAL PHARMACY W/ HCPCS (ALT 636 FOR OP/ED): Performed by: INTERNAL MEDICINE

## 2023-10-18 PROCEDURE — 71045 X-RAY EXAM CHEST 1 VIEW: CPT | Performed by: RADIOLOGY

## 2023-10-18 PROCEDURE — 94640 AIRWAY INHALATION TREATMENT: CPT

## 2023-10-18 PROCEDURE — 36415 COLL VENOUS BLD VENIPUNCTURE: CPT | Performed by: NURSE PRACTITIONER

## 2023-10-18 PROCEDURE — 2500000001 HC RX 250 WO HCPCS SELF ADMINISTERED DRUGS (ALT 637 FOR MEDICARE OP): Performed by: INTERNAL MEDICINE

## 2023-10-18 PROCEDURE — 85027 COMPLETE CBC AUTOMATED: CPT | Performed by: NURSE PRACTITIONER

## 2023-10-18 PROCEDURE — 80053 COMPREHEN METABOLIC PANEL: CPT | Performed by: NURSE PRACTITIONER

## 2023-10-18 PROCEDURE — 71045 X-RAY EXAM CHEST 1 VIEW: CPT | Mod: FY

## 2023-10-18 PROCEDURE — 2500000002 HC RX 250 W HCPCS SELF ADMINISTERED DRUGS (ALT 637 FOR MEDICARE OP, ALT 636 FOR OP/ED): Performed by: INTERNAL MEDICINE

## 2023-10-18 RX ADMIN — QUETIAPINE FUMARATE 12.5 MG: 25 TABLET ORAL at 10:24

## 2023-10-18 RX ADMIN — IPRATROPIUM BROMIDE AND ALBUTEROL SULFATE 3 ML: .5; 3 SOLUTION RESPIRATORY (INHALATION) at 08:30

## 2023-10-18 RX ADMIN — Medication 1 TABLET: at 10:24

## 2023-10-18 RX ADMIN — CHOLESTYRAMINE 4 G: 4 POWDER, FOR SUSPENSION ORAL at 10:26

## 2023-10-18 ASSESSMENT — PAIN - FUNCTIONAL ASSESSMENT
PAIN_FUNCTIONAL_ASSESSMENT: 0-10

## 2023-10-18 ASSESSMENT — PAIN SCALES - GENERAL
PAINLEVEL_OUTOF10: 0 - NO PAIN

## 2023-10-18 NOTE — DISCHARGE SUMMARY
Discharge Diagnosis  COVID    Issues Requiring Follow-Up  Patient less short of breath.  Not requiring oxygen supplementation.    Discharge Meds     Your medication list        START taking these medications        Instructions Last Dose Given Next Dose Due   dexAMETHasone 6 mg tablet  Commonly known as: Decadron      Take 1 tablet (6 mg) by mouth once daily for 10 days.       doxycycline 100 mg capsule  Commonly known as: Vibramycin      Take 1 capsule (100 mg) by mouth 2 times a day for 10 days. Take with at least 8 ounces (large glass) of water, do not lie down for 30 minutes after       guaiFENesin 600 mg 12 hr tablet  Commonly known as: Mucinex      Take 1 tablet (600 mg) by mouth 2 times a day for 14 days. Do not crush, chew, or split.              CONTINUE taking these medications        Instructions Last Dose Given Next Dose Due   tamsulosin 0.4 mg 24 hr capsule  Commonly known as: Flomax           warfarin 3 mg tablet  Commonly known as: Coumadin                     Where to Get Your Medications        These medications were sent to [id number] - [address] AT [facility]/[address], [facility] [address]      Phone: [phone]   dexAMETHasone 6 mg tablet  doxycycline 100 mg capsule  guaiFENesin 600 mg 12 hr tablet         Test Results Pending At Discharge  Pending Labs       Order Current Status    Blood Culture Preliminary result    Blood Culture Preliminary result            Hospital Course         Tex August MD  Physician  Internal Medicine     Progress Notes      Signed     Date of Service: 10/17/2023  5:49 PM     Signed       Expand All Collapse All    Fredrick Hutton is a 89 y.o. male on day 2 of admission presenting with COVID.           Subjective   Patient having increased confusion              Objective   Last Recorded Vitals  /81 (BP Location: Right arm, Patient Position: Sitting)   Pulse 80   Temp 35.9 °C (96.6 °F)  (Temporal)   Resp 17   Wt 66.7 kg (147 lb)   SpO2 94%   Intake/Output last 3 Shifts:     Intake/Output Summary (Last 24 hours) at 10/17/2023 1749  Last data filed at 10/17/2023 1630      Gross per 24 hour   Intake 550 ml   Output --   Net 550 ml         Admission Weight  Weight: 66.7 kg (147 lb) (10/15/23 1529)     Daily Weight  10/15/23 : 66.7 kg (147 lb)     Image Results  CT chest wo IV contrast  Narrative: Interpreted By:  Magalie Ramirez,   STUDY:  CT CHEST WO IV CONTRAST;  10/16/2023 10:20 am      INDICATION:  COVID PNA COUGHING UP BLOOD.      COMPARISON:  Chest 09/02/2023      ACCESSION NUMBER(S):  [id number]      ORDERING CLINICIAN:  MARY KAY CABAN      TECHNIQUE:  Helical data acquisition of the chest was obtained without IV  contrast material.  Images were reformatted in axial, coronal, and  sagittal planes.      FINDINGS:  LINE AND DEVICES:  Left cardiac pacer leads terminating in the right atrium and right  ventricle.      LUNGS AND AIRWAYS:  The trachea and central airways are patent without endobronchial  lesions.      Stable background moderate-to-severe centrilobular and paraseptal  emphysema. Similar-appearing severe right upper lobe subpleural  fibrotic changes with intervening airspace opacities. Dyer  subpleural bullae are present in the bilateral lower lobes and  lingula. Stable bilateral pleural effusions, small on right and trace  on the left. No new focal consolidation, pulmonary edema or  pneumothorax. There is no suspicious nodules.      MEDIASTINUM AND MAKENNA, LOWER NECK AND AXILLA:  The visualized thyroid gland is within normal limits.      No pathologically enlarged thoracic lymphadenopathy is identified.      Esophagus appears within normal limits as seen.      HEART AND VESSELS:  The thoracic aorta is of normal course and caliber with mild  atherosclerotic calcifications.      Main pulmonary artery is normal in caliber.      Minimal coronary artery calcifications in the LAD  and circumflex  arteries. The study is not optimized for evaluation of coronary  arteries.      Mild left atrial enlargement.      No evidence of pericardial effusion.      UPPER ABDOMEN:  No acute findings.      CHEST WALL AND OSSEOUS STRUCTURES:  No acute osseous lesions. Severe left acromioclavicular  osteoarthritis.      Impression: Stable right upper interstitial fibrosis with superimposed airspace  opacities. Differential considerations may include persistent  pneumonia, chronic atelectasis, and neoplastic process. Consider  follow-up in 3-6 months to resolution.      Stable small right and trace left pleural effusions.      MACRO:  None      Signed by: Magalie Ramirez 10/16/2023 12:04 PM  Dictation workstation:   [id number]        Physical Exam     Relevant Results                       Assessment/Plan   This patient currently has cardiac telemetry ordered; if you would like to modify or discontinue the telemetry order, click hereto go to the orders activity to modify/discontinue the order.                    Principal Problem:    PAXTON August MD  Physician  Internal Medicine     H&P      Addendum     Date of Service: 10/15/2023  7:50 PM      Addendum        Expand All Collapse All    History Of Present Illness  Fredrick Hutton is a 89 y.o. male with a medical history of permanent atrial fibrillation (on Coumadin), sick sinus syndrome, pacemaker placement, arthritis, who presented to [facility] ED today from home with cough and shortness of breath.  Patient states symptoms have been going on for the past 2-3 weeks.  States he had some scant hemoptysis, (granddaughter states sputum looks very bloody).  He states he has had some generalized malaise and arthralgias that started about 5 days ago.  He was recently diagnosed with pneumonia just over a month ago. States his cough never really went away and does not feel much improvement from when he came here in September and diagnosed with  pneumonia. His daughter was recently diagnosed with COVID last wednesday so he did have exposure. Denies known fever, chills, dizziness, chest pain, abdominal pain, urinary symptoms, diarrhea, or constipation. PCP: Dr. Sergo Theodore. Cardiologist: Dr. Núñez. Podiatrist Dr. Teofilo Solis  ER Course: VS on arrival: 37.1C, HR 79, RR 17, 107/60, 91% on RA. EKG unavailable for my review. Labs show RBC 4.38. Platelets 145. sodium 134. BUN 25, GFR 54. AST 54. Troponin 35. PT 25.2, INR 2.2. Covid-10+. Flu negative. Blood cultures collected. See imaging results below. Azithromycin, ceftriaxone, dexamethasone given in ED. Pt will be admitted under the care of Dr. Vaughn who will continue to follow. I was asked to H&P and place initial admission orders.     Past Medical History  As above        Surgical History  As above        Social History  Former smoker. Denies alcohol or drug use. Lives with daughter. Ambulates independently.     Family History  Family History   No family history on file.         Allergies  Patient has no known allergies.     Review of Systems   10 point review of systems negative except as noted above.     Physical Exam  Constitutional:       Appearance: Normal appearance.   HENT:      Head: Normocephalic.      Nose: Nose normal.      Mouth/Throat:      Mouth: Mucous membranes are moist.      Pharynx: Oropharynx is clear.   Eyes:      Extraocular Movements: Extraocular movements intact.      Conjunctiva/sclera: Conjunctivae normal.      Pupils: Pupils are equal, round, and reactive to light.   Cardiovascular:      Rate and Rhythm: Normal rate. Rhythm irregular.      Pulses: Normal pulses.      Heart sounds: Normal heart sounds.   Pulmonary:      Effort: Pulmonary effort is normal.      Breath sounds: Normal breath sounds.   Abdominal:      General: Abdomen is flat. Bowel sounds are normal.      Palpations: Abdomen is soft.   Musculoskeletal:         General: Normal range of motion.      Cervical back:  "Normal range of motion and neck supple.   Skin:     General: Skin is warm and dry.      Capillary Refill: Capillary refill takes less than 2 seconds.   Neurological:      General: No focal deficit present.      Mental Status: He is alert and oriented to person, place, and time.   Psychiatric:         Mood and Affect: Mood normal.         Behavior: Behavior normal.         Thought Content: Thought content normal.         Judgment: Judgment normal.            Last Recorded Vitals  Blood pressure 101/62, pulse 78, temperature 37.1 °C (98.8 °F), resp. rate 20, height 1.753 m (5' 9\"), weight 66.7 kg (147 lb), SpO2 95 %.     Relevant Results            Results for orders placed or performed during the hospital encounter of 10/15/23 (from the past 24 hour(s))   Comprehensive Metabolic Panel   Result Value Ref Range     Glucose 89 74 - 99 mg/dL     Sodium 134 (L) 136 - 145 mmol/L     Potassium 4.1 3.5 - 5.3 mmol/L     Chloride 101 98 - 107 mmol/L     Bicarbonate 22 21 - 32 mmol/L     Anion Gap 15 10 - 20 mmol/L     Urea Nitrogen 25 (H) 6 - 23 mg/dL     Creatinine 1.27 0.50 - 1.30 mg/dL     eGFR 54 (L) >60 mL/min/1.73m*2     Calcium 8.6 8.6 - 10.3 mg/dL     Albumin 4.0 3.4 - 5.0 g/dL     Alkaline Phosphatase 85 33 - 136 U/L     Total Protein 7.2 6.4 - 8.2 g/dL     AST 45 (H) 9 - 39 U/L     Bilirubin, Total 1.2 0.0 - 1.2 mg/dL     ALT 23 10 - 52 U/L   Lactate   Result Value Ref Range     Lactate 1.6 0.4 - 2.0 mmol/L   Troponin I, High Sensitivity   Result Value Ref Range     Troponin I, High Sensitivity 35 (H) 0 - 20 ng/L   Protime-INR   Result Value Ref Range     Protime 25.2 (H) 9.8 - 12.8 seconds     INR 2.2 (H) 0.9 - 1.1   SARS-CoV-2 RT PCR   Result Value Ref Range     Coronavirus 2019, PCR Detected (A) Not Detected   Influenza A, and B PCR   Result Value Ref Range     Flu A Result Not Detected Not Detected     Flu B Result Not Detected Not Detected   CBC and Auto Differential   Result Value Ref Range     WBC 5.2 4.4 - " 11.3 x10*3/uL     nRBC 0.0 0.0 - 0.0 /100 WBCs     RBC 4.38 (L) 4.50 - 5.90 x10*6/uL     Hemoglobin 14.1 13.5 - 17.5 g/dL     Hematocrit 42.5 41.0 - 52.0 %     MCV 97 80 - 100 fL     MCH 32.2 26.0 - 34.0 pg     MCHC 33.2 32.0 - 36.0 g/dL     RDW 14.5 11.5 - 14.5 %     Platelets 145 (L) 150 - 450 x10*3/uL     MPV 9.5 7.5 - 11.5 fL     Neutrophils % 55.8 40.0 - 80.0 %     Immature Granulocytes %, Automated 0.2 0.0 - 0.9 %     Lymphocytes % 25.7 13.0 - 44.0 %     Monocytes % 18.1 2.0 - 10.0 %     Eosinophils % 0.0 0.0 - 6.0 %     Basophils % 0.2 0.0 - 2.0 %     Neutrophils Absolute 2.89 1.60 - 5.50 x10*3/uL     Immature Granulocytes Absolute, Automated 0.01 0.00 - 0.50 x10*3/uL     Lymphocytes Absolute 1.33 0.80 - 3.00 x10*3/uL     Monocytes Absolute 0.94 (H) 0.05 - 0.80 x10*3/uL     Eosinophils Absolute 0.00 0.00 - 0.40 x10*3/uL     Basophils Absolute 0.01 0.00 - 0.10 x10*3/uL      XR chest 1 view     Result Date: 10/15/2023  Interpreted By:  Khanh Rodriguez, STUDY: XR CHEST 1 VIEW;  10/15/2023 4:46 pm   INDICATION: Signs/Symptoms:sob.   COMPARISON: Portable chest and CT chest with contrast both from 2 September 2023   ACCESSION NUMBER(S): [id number]   ORDERING CLINICIAN: SYED DAVIS   TECHNIQUE: Single frontal view of the chest; Portable technique   FINDINGS:   The cardiomediastinal silhouette is unchanged   Background emphysema unchanged   Unchanged right upper lobe consolidation and/or scarring   No new acute process such as a new area of airspace disease, large pleural effusion or demonstrable pneumothorax        Abnormal but unchanged from 2 September 2023   MACRO: None   Signed by: Khanh Rodriguez 10/15/2023 4:58 PM Dictation workstation:   [id number]            Assessment/Plan   Principal Problem:    COVID     89 year old male with a medical history of permanent atrial fibrillation (on Coumadin), sick sinus syndrome, pacemaker placement, arthritis, who presented to [facility] ED today from home with cough and  shortness of breath.  Patient states symptoms have been going on for the past 5 days.  States he had some scant hemoptysis, (granddaughter states sputum looks very bloody).  He states he has had some generalized malaise and arthralgias.  He was recently diagnosed with pneumonia just over a month ago. Pulmonology consulted. Continue antibiotics, nebulizers, steroids. Patient will be hospitalized for further medical management.     #Covid 19+ (supplemental oxygen requirement d/t mild hypoxia)  #Suspect pneumonia (RUL), suspect gram negative organism (recently had pneumonia about 1 month ago)  #Hemoptysis  #Elevated troponin, likely 2/2 demand ischemia  #Mild thrombocytopenia  #Generalized weakness  Admit to inpatient/telemetry to Dr. Vaughn  Pulmonology consult and appreciate recs  See imaging results below  Isolation precautions  Continue azithromycin and ceftriaxone  Dexamethosone 6mg IVP daily  Nebulizers  Mucinex  Hold Coumadin due to hemoptysis  Titrate oxygen to maintain sats >92%  Incentive spirometry  Troponin 35. Will trend.  Bronchial hygiene  Sputum culture ordered  Strep pneumonia and legionella urine ordered  UA ordered  Repeat labs in AM     Chronic issues  #Atrial fibrillation  #Sick sinus syndrome  #Arthritis  Continue home meds as appropriate when nursing completes home med rec.  Regular diet  Full code     #DVT prophylaxis  Hold Coumadin due to hemoptysis  SCD's        I spent 45 minutes in the professional and overall care of this patient.     FULLY EVALUATED AND PLAN   Beatriz Gay, SHRUTHI-CNP                        Revision History                Patient fully evaluated on October 17.  Requiring oxygen at increased levels.  Remdesivir added to present regimen.  Repeat chest x-ray ordered.  Seroquel for delirium.     Tex August MD                           Pertinent Physical Exam At Time of Discharge  Physical Exam    Outpatient Follow-Up  Future Appointments   Date Time Provider  Department Center   10/24/2023  2:15 PM PAR [id number] [facility] PHARMACIST [id number] [place]   11/14/2023  3:20 PM SHRUTHI Berrios-CNP [id number] [place]     Patient fully evaluated on October 18.  No longer requiring supplemental oxygen.  Patient to discharge home with home care.    Tex August MD

## 2023-10-18 NOTE — CONSULTS
Nutrition Assessment Note      Name: Fredrick Hutton  ROOM: 4/4-A  Nurse:No care team member to display    AGE: 89 y.o.    GENDER: male MRN: [id number]  Code: Full Code         Reason for Assessment  Reason for Assessment: Admission nursing screening (missed MST=3)  Pt admitted for COVID pnemonia; generalized weakness. Confusion and agitation noted.      Subjective      RECOMMENDATIONS:  1) Diet as ordered and tolerated   2) Ensure plus high protein twice daily (For an additional 350 kcals, 20 gm protein each)    3) Reweigh at least weekly   4) consider daily MVI       Difficulty chewing: on regular diet  Difficulty swallowing: on regular diet     Objective   Per Flowsheet Percent Meal intake:  not established yet   Dietary Orders (From admission, onward)       Start     Ordered    10/18/23 1230  Oral nutritional supplements  Until discontinued        Question Answer Comment   Deliver with Breakfast    Deliver with Dinner    Select supplement: Ensure Plus High Protein        10/18/23 1230    10/15/23 1917  Adult diet Regular  Diet effective now        Question:  Diet type  Answer:  Regular    10/15/23 1922                       Current Facility-Administered Medications:     albuterol 2.5 mg /3 mL (0.083 %) nebulizer solution 3 mL, 3 mL, nebulization, q4h PRN, Tex August MD    azithromycin (Zithromax) in dextrose 5 % in water (D5W) 250 mL  mg, 500 mg, intravenous, q24h, JAMES Ruelas, Stopped at 10/17/23 2321    cefTRIAXone (Rocephin) 2 g IV in dextrose 5% 50 mL, 2 g, intravenous, q24h, JAMES Ruelas, Stopped at 10/18/23 0018    cholestyramine light (Prevalite) 4 gram packet 4 g, 4 g, oral, BID, Tex August MD, 4 g at 10/18/23 1026    dexAMETHasone (PF) (Decadron) injection 6 mg, 6 mg, intravenous, q24h, JAMES Ruelas, 6 mg at 10/17/23 2221    guaiFENesin (Mucinex) 12 hr tablet 600 mg, 600 mg, oral, BID PRN, JAMES Ruelas, 600 mg at  "10/16/23 1052    ipratropium-albuteroL (Duo-Neb) 0.5-2.5 mg/3 mL nebulizer solution 3 mL, 3 mL, nebulization, TID PRN, Tex August MD, 3 mL at 10/18/23 0830    lactobacillus acidophilus tablet 1 tablet, 1 tablet, oral, Daily, Tex August MD, 1 tablet at 10/18/23 1024    oxygen (O2) therapy, , inhalation, Continuous PRN - O2/gases, Luis Daniel Andrade MD, Start at 10/15/23 1902    oxygen (O2) therapy, , inhalation, Continuous PRN - O2/gases, SHRUTHI Ruelas-CNP    QUEtiapine (SEROquel) tablet 12.5 mg, 12.5 mg, oral, BID, Tex August MD, 12.5 mg at 10/18/23 1024    [COMPLETED] remdesivir (Veklury) 200 mg in sodium chloride 0.9% 250 mL IV, 200 mg, intravenous, Once, Stopped at 10/17/23 1730 **FOLLOWED BY** remdesivir (Veklury) 100 mg in sodium chloride 0.9% 250 mL IV, 100 mg, intravenous, q24h, Tex August MD    tamsulosin (Flomax) 24 hr capsule 0.4 mg, 0.4 mg, oral, Nightly, JAMES Ruelas, 0.4 mg at 10/17/23 2236  Results from last 7 days   Lab Units 10/18/23  0458 10/17/23  0506 10/16/23  0542   GLUCOSE mg/dL 119* 132* 124*   SODIUM mmol/L 134* 136 136   POTASSIUM mmol/L 4.1 4.3 4.2   CHLORIDE mmol/L 102 106 107   CO2 mmol/L 21 21 23   BUN mg/dL 40* 33* 25*   CREATININE mg/dL 1.21 0.99 1.07   EGFR mL/min/1.73m*2 57* 73 66   CALCIUM mg/dL 8.5* 8.8 8.2*     No results found for: \"HGBA1C\"      GI per flowsheet:  Gastrointestinal  Gastrointestinal (WDL): Exceptions to WDL  Abdomen Inspection: Soft, Nondistended  Abdominal Tenderness: Nontender  Bowel Sounds: All quadrants  Bowel Sounds (All Quadrants): Active  Passing Flatus: Yes  Last BM Date: 10/18/23  Bowel Incontinence: No  Last bowel movement documented: 10/18/23  Past Medical History:   Diagnosis Date    Permanent atrial fibrillation (CMS/HCC) 03/01/2022    Permanent atrial fibrillation with RVR    Personal history of other diseases of the circulatory system     History of cardiac disorder     Principal Problem:    " "COVID     Allergies: Patient has no known allergies.     Anthropometrics:  Height: 175.3 cm (5' 9.02\")  Weight: 66.7 kg (147 lb 0.8 oz)  BMI (Calculated): 21.71       Daily Weight  10/18/23 : 66.7 kg (147 lb 0.8 oz)  05/09/23 : 67.3 kg (148 lb 6 oz)  12/21/22 : 66.5 kg (146 lb 8 oz)  12/07/22 : 66.7 kg (147 lb)  11/08/22 : 66.7 kg (147 lb)  03/01/22 : 65.3 kg (144 lb)  11/02/21 : 66.2 kg (146 lb)    Weight History / % Weight Change: 9/2 69kg, 5/9 67.3kg, 12/21/22 66.5kg, 11/8/22 66.7kg  Significant Weight Loss: No      Ideal Body Weight: 72.7kg  % IBW: 92%      Total Energy Estimated Needs (kCal):  (0655-1534 kcals)  Total Estimated Energy Need per Day (kCal/kg):  (27-30 ABW)    Total Protein Estimated Needs (g):  (66-80 g)  Total Protein Estimated Needs (g/kg):  (1.0-1.2 ABW)    Method for Estimating Needs: 1ml/kcal or per MD    Nutrition Focused Physical Findings:   Orbital Fat Pads:  (deferred due to covid isolation)    Edema  Edema: none    Skin:  (no wounds noted on nursing flow sheet)  Skin:  (please see nursing/wound notes for further details)    Pain Score: 0 - No pain     Diagnosis   Patient has Malnutrition Diagnosis: No    Pt has Nutrition Diagnosis: Patient has Nutrition Diagnosis: Yes  New  Nutrition Diagnosis 1: Predicted inadequate energy intake  Related to (1): acute illnes with covid; advanced age  As Evidenced by (1): MST reports decreased po intakes        Intervention:  Coordination of Care:      Interventions: Meals and snacks  Meals and Snacks: General healthful diet  Goal: consume >75% of meals  Additional Interventions: will order Ensure plus high protein twice daily until intakes established    Education Documentation  No documentation found.    Goals:  Consume >75% of meals and ONS, Labs WNL, Maintain skin integrity, Stable weight, BM at least every 3 days     Recommendation(s):  See above     Individualized Nutrition Prescription Provided for : Regular diet    Monitoring and Evaluation "   Weights  Labs  PO intake  Skin integrity          Time Spent (min): 45 minutes  Last Date of Nutrition Visit: 10/18/23  Nutrition Follow-Up Needed?: 3-5 days  Follow up Comment: 10/23 TG

## 2023-10-18 NOTE — NURSING NOTE
Pt continues to remove tele monitor. Pt becoming increasingly agitated with staff. Telemetry monitor removed.

## 2023-10-18 NOTE — CARE PLAN
Problem: Fall/Injury  Goal: Not fall by end of shift  Outcome: Progressing  Goal: Be free from injury by end of the shift  Outcome: Progressing     Problem: Respiratory  Goal: No signs of respiratory distress (eg. Use of accessory muscles. Peds grunting)  Outcome: Progressing     Problem: Psychosocial Needs  Goal: Demonstrates ability to cope with hospitalization/illness  Outcome: Progressing  Goal: Collaborate with me, my family, and caregiver to identify my specific goals  Outcome: Progressing         The patient's goals for the shift include safety maintained     The clinical goals for the shift include safety maintained

## 2023-10-18 NOTE — NURSING NOTE
8:31 PM Late entry for 1920. Assumed care of pt. Nursing report received from CHELY De Anda. Pt ambulating from restroom at this time. Pt with steady gait. Denies needs at this time.     6:24 AM Late entry for 0400. Pt bed alarm going off. Pt throughout night getting OOB without assistance and not redirectable. Pt uncooperative and telling this RN to shut up on several occasions. Pt assisted back to bed with assistance and bed alarm set.

## 2023-10-18 NOTE — NURSING NOTE
Pt has not needed o2 therapy yet- requested confirmation for remdesivir IV to dr. August and Ally Ruth, CNP. Pt passed walking o2 test in room on Monday.     17:30 Pt left via wheelchair with son Khanh. Reviewed dc documentation and education with Khanh. All questions answered.

## 2023-10-19 LAB
BACTERIA BLD CULT: NORMAL
BACTERIA BLD CULT: NORMAL

## 2023-10-20 ENCOUNTER — HOME HEALTH ADMISSION (OUTPATIENT)
Dept: HOME HEALTH SERVICES | Facility: HOME HEALTH | Age: 88
End: 2023-10-20
Payer: COMMERCIAL

## 2023-10-22 PROBLEM — I49.5 SICK SINUS SYNDROME (MULTI): Status: ACTIVE | Noted: 2023-10-22

## 2023-10-22 PROBLEM — R04.0 EPISTAXIS: Status: ACTIVE | Noted: 2023-10-22

## 2023-10-22 PROBLEM — I48.21 PERMANENT ATRIAL FIBRILLATION (MULTI): Status: ACTIVE | Noted: 2023-10-22

## 2023-10-22 PROBLEM — M19.90 ARTHRITIS: Status: ACTIVE | Noted: 2023-10-22

## 2023-10-22 PROBLEM — I44.2 COMPLETE AV BLOCK DUE TO AV NODAL ABLATION (MULTI): Status: ACTIVE | Noted: 2023-10-22

## 2023-10-22 PROBLEM — J34.2 NASAL SEPTAL DEVIATION: Status: ACTIVE | Noted: 2023-10-22

## 2023-10-22 PROBLEM — I97.190 COMPLETE AV BLOCK DUE TO AV NODAL ABLATION (MULTI): Status: ACTIVE | Noted: 2023-10-22

## 2023-10-22 PROBLEM — Z95.0 CARDIAC PACEMAKER IN SITU: Status: ACTIVE | Noted: 2023-10-22

## 2023-10-22 RX ORDER — FLUTICASONE PROPIONATE 50 MCG
1 SPRAY, SUSPENSION (ML) NASAL NIGHTLY
COMMUNITY
Start: 2023-09-02

## 2023-10-24 ENCOUNTER — APPOINTMENT (OUTPATIENT)
Dept: PHARMACY | Facility: CLINIC | Age: 88
End: 2023-10-24
Payer: COMMERCIAL

## 2023-11-14 ENCOUNTER — APPOINTMENT (OUTPATIENT)
Dept: UROLOGY | Facility: CLINIC | Age: 88
End: 2023-11-14
Payer: COMMERCIAL